# Patient Record
Sex: MALE | Race: WHITE | NOT HISPANIC OR LATINO | Employment: OTHER | URBAN - METROPOLITAN AREA
[De-identification: names, ages, dates, MRNs, and addresses within clinical notes are randomized per-mention and may not be internally consistent; named-entity substitution may affect disease eponyms.]

---

## 2017-04-04 PROBLEM — E11.9 TYPE 2 DIABETES MELLITUS WITHOUT COMPLICATION, WITHOUT LONG-TERM CURRENT USE OF INSULIN: Status: ACTIVE | Noted: 2017-04-04

## 2017-07-20 DIAGNOSIS — E61.1 IRON DEFICIENCY: Primary | ICD-10-CM

## 2017-07-20 RX ORDER — IRON,CARBONYL/ASCORBIC ACID 100-250 MG
TABLET ORAL
Qty: 30 TABLET | Refills: 5 | Status: SHIPPED | OUTPATIENT
Start: 2017-07-20 | End: 2018-01-16 | Stop reason: SDUPTHER

## 2017-07-30 DIAGNOSIS — E53.8 B12 DEFICIENCY: Primary | ICD-10-CM

## 2017-08-06 RX ORDER — CYANOCOBALAMIN 1000 UG/ML
INJECTION, SOLUTION INTRAMUSCULAR; SUBCUTANEOUS
Qty: 6 ML | Status: SHIPPED | OUTPATIENT
Start: 2017-08-06 | End: 2018-09-30 | Stop reason: SDUPTHER

## 2017-10-13 PROBLEM — E11.42 DIABETIC POLYNEUROPATHY ASSOCIATED WITH TYPE 2 DIABETES MELLITUS: Status: ACTIVE | Noted: 2017-10-13

## 2018-01-16 DIAGNOSIS — E61.1 IRON DEFICIENCY: ICD-10-CM

## 2018-01-16 RX ORDER — IRON,CARBONYL/ASCORBIC ACID 100-250 MG
TABLET ORAL
Qty: 30 TABLET | Refills: 4 | Status: SHIPPED | OUTPATIENT
Start: 2018-01-16 | End: 2018-06-20 | Stop reason: SDUPTHER

## 2018-04-18 ENCOUNTER — OFFICE VISIT (OUTPATIENT)
Dept: HEMATOLOGY/ONCOLOGY | Facility: CLINIC | Age: 73
End: 2018-04-18
Payer: MEDICARE

## 2018-04-18 VITALS
WEIGHT: 205.13 LBS | RESPIRATION RATE: 18 BRPM | BODY MASS INDEX: 31.09 KG/M2 | HEART RATE: 77 BPM | DIASTOLIC BLOOD PRESSURE: 77 MMHG | HEIGHT: 68 IN | SYSTOLIC BLOOD PRESSURE: 143 MMHG | TEMPERATURE: 99 F

## 2018-04-18 DIAGNOSIS — D63.8 CHRONIC DISEASE ANEMIA: ICD-10-CM

## 2018-04-18 DIAGNOSIS — D68.9 BLOOD CLOTTING DISORDER: Primary | ICD-10-CM

## 2018-04-18 DIAGNOSIS — D51.0 PERNICIOUS ANEMIA: ICD-10-CM

## 2018-04-18 DIAGNOSIS — D64.9 ANEMIA, UNSPECIFIED TYPE: ICD-10-CM

## 2018-04-18 DIAGNOSIS — D50.8 IRON DEFICIENCY ANEMIA SECONDARY TO INADEQUATE DIETARY IRON INTAKE: ICD-10-CM

## 2018-04-18 DIAGNOSIS — Z15.89 HETEROZYGOUS MTHFR MUTATION A1298C: ICD-10-CM

## 2018-04-18 PROCEDURE — 99213 OFFICE O/P EST LOW 20 MIN: CPT | Mod: ,,, | Performed by: INTERNAL MEDICINE

## 2018-04-18 RX ORDER — LEVOCETIRIZINE DIHYDROCHLORIDE 5 MG/1
5 TABLET, FILM COATED ORAL
COMMUNITY

## 2018-04-18 NOTE — LETTER
April 18, 2018      Lemuel Andino MD  [address]  [address]  [facility] [address]           [facility] Hematology Oncology  [address] [facility]  [address]  [facility] [address]  Phone: [phone]  Fax: [phone]          Patient: Kleber Osman   MR Number: [id number]   YOB: 1945   Date of Visit: 4/18/2018       Dear Dr. Lemuel Andino:    Thank you for referring Kleber Osman to me for evaluation. Attached you will find relevant portions of my assessment and plan of care.    If you have questions, please do not hesitate to call me. I look forward to following Kleber Osman along with you.    Sincerely,    Bryce Stewart MD    Enclosure  CC:  No Recipients    If you would like to receive this communication electronically, please contact [email or web address] or [phone] to request more information on [employer] Link access.    For providers and/or their staff who would like to refer a patient to [facility], please contact us through our one-stop-shop provider referral line, [facility], at [phone].    If you feel you have received this communication in error or would no longer like to receive these types of communications, please e-mail [email or web address]

## 2018-04-18 NOTE — PROGRESS NOTES
[facility] Hematology/Oncology  PROGRESS NOTE      Subjective:       Patient ID:   NAME: Kleber Osman : 1945     73 y.o. male    Referring Doc: Arabella  Other Physicians: Vikki    Chief Complaint:  anemia f/u    History of Present Illness:     Patient returns today for a regularly scheduled follow-up visit.  The patient last showed for a hematology clinic appointment in Dec 2016. Last available labs are from Dec 2017. He had some bronchitis issues and required steroids. He is still on coumadin. He denies any current CP, SOB, HA's or N/V. He is by himself.             ROS:   GEN: normal without any fever, night sweats or weight loss  HEENT: normal with no HA's, sore throat, stiff neck, changes in vision  CV: normal with no CP, SOB, PND, JUDGE or orthopnea  PULM: normal with no SOB, cough, hemoptysis, sputum or pleuritic pain  GI: normal with no abdominal pain, nausea, vomiting, constipation, diarrhea, melanotic stools, BRBPR, or hematemesis  : normal with no hematuria, dysuria  BREAST: normal with no mass, discharge, pain  SKIN: normal with no rash, erythema, bruising, or swelling    Allergies:  Review of patient's allergies indicates:   Allergen Reactions    Prednisone      Pt states Dr. Bell told him not to take prednisone due to coumadin use     Lorcet (hydrocodone) [hydrocodone-acetaminophen] Other (See Comments)     Hallucinations       Medications:    Current Outpatient Prescriptions:     levocetirizine (XYZAL) 5 MG tablet, Take 5 mg by mouth every evening., Disp: , Rfl:     albuterol (PROAIR HFA) 90 mcg/actuation inhaler, Inhale 2 puffs into the lungs every 6 (six) hours as needed for Wheezing., Disp: , Rfl:     amlodipine (NORVASC) 10 MG tablet, Take 1 tablet by mouth once daily., Disp: , Rfl:     aspirin 325 MG tablet, Take 325 mg by mouth once daily., Disp: , Rfl:     benazepril (LOTENSIN) 40 MG tablet, Take 1 tablet by mouth once daily., Disp: , Rfl:     cyanocobalamin 1,000 mcg/mL  "injection, INJECT ONE ML INTRAMUSCULARLY ONCE A MONTH, Disp: 6 mL, Rfl: PRN    cyclobenzaprine (FLEXERIL) 10 MG tablet, TAKE ONE TABLET BY MOUTH THREE TIMES DAILY AS NEEDED FOR SEVERE PAIN, Disp: 30 tablet, Rfl: 0    irbesartan-hydrochlorothiazide (AVALIDE) 150-12.5 mg per tablet, TAKE ONE TABLET BY MOUTH DAILY, Disp: 30 tablet, Rfl: 11    iron-vitamin C 100-250 mg, ICAR-C, 100-250 mg Tab, TAKE ONE TABLET BY MOUTH DAILY, Disp: 30 tablet, Rfl: 4    isosorbide mononitrate (IMDUR) 30 MG 24 hr tablet, Take 1 tablet by mouth once daily., Disp: , Rfl:     lovastatin (MEVACOR) 40 MG tablet, TAKE ONE TABLET BY MOUTH ONE TIME DAILY IN THE EVENING, Disp: 30 tablet, Rfl: 11    metFORMIN (GLUCOPHAGE) 1000 MG tablet, TAKE ONE TABLET BY MOUTH WITH EVENING MEAL., Disp: 30 tablet, Rfl: 0    metoprolol tartrate (LOPRESSOR) 50 MG tablet, TAKE ONE TABLET BY MOUTH TWICE DAILY., Disp: 60 tablet, Rfl: 9    triamcinolone (NASACORT) 55 mcg nasal inhaler, 2 sprays by Nasal route once daily., Disp: , Rfl:     warfarin (COUMADIN) 5 MG tablet, Take 5 mg by mouth Daily. 5mg 4 days a week and 7.5mg 3 days a week, Disp: , Rfl:     PMHx/PSHx Updates:  See patient's last visit with me on 12/12/2016.  See H&P on 12/4/2012        Pathology:  none          Objective:     Vitals:  Blood pressure (!) 143/77, pulse 77, temperature 98.5 °F (36.9 °C), resp. rate 18, height 5' 8" (1.727 m), weight 93 kg (205 lb 1.6 oz).    Physical Examination:   GEN: no apparent distress, comfortable; AAOx3  HEAD: atraumatic and normocephalic  EYES: no pallor, no icterus, PERRLA  ENT: OMM, no pharyngeal erythema, external ears WNL; no nasal discharge; no thrush  NECK: no masses, thyroid normal, trachea midline, no LAD/LN's, supple  CV: RRR with no murmur; normal pulse; normal S1 and S2; no pedal edema  CHEST: Normal respiratory effort; CTAB; normal breath sounds; no wheeze or crackles  ABDOM: nontender and nondistended; soft; normal bowel sounds; no " rebound/guarding  MUSC/Skeletal: ROM normal; no crepitus; joints normal; no deformities or arthropathy  EXTREM: no clubbing, cyanosis, inflammation or swelling  SKIN: no rashes, lesions, ulcers, petechiae or subcutaneous nodules  : no castillo  NEURO: grossly intact; motor/sensory WNL; AAOx3; no tremors  PSYCH: normal mood, affect and behavior  LYMPH: normal cervical, supraclavicular, axillary and groin LN's            Labs:     12/20/2017  Lab Results   Component Value Date    WBC 8.84 12/20/2017    HGB 13.7 (L) 12/20/2017    HCT 41.4 12/20/2017    MCV 91 12/20/2017     12/20/2017     BMP  Lab Results   Component Value Date     12/20/2017    K 3.5 12/20/2017    CL 99 12/20/2017    CO2 31 12/20/2017    BUN 14 12/20/2017    CREATININE 0.75 12/20/2017    CALCIUM 9.7 12/20/2017    ANIONGAP 14 12/20/2017    ESTGFRAFRICA >60 12/20/2017    EGFRNONAA >60 12/20/2017     Lab Results   Component Value Date    ALT 28 12/20/2017    AST 26 12/20/2017    ALKPHOS 59 12/20/2017    BILITOT 0.5 12/20/2017           Radiology/Diagnostic Studies:    No results found.    I have reviewed all available lab results and radiology reports.    Assessment/Plan:   (1) 73 y.o. male with diagnosis of mild chronic anemia in past  - NCNC parameters with B12 and iron deficiency components  - previously on iron and B12 supplements  - hgb adequate in dec 2017 with hgb at 13.7      (2) HTN    (3) CAD S/P MI in past  - underlying MTHFR-A heterozygous   - followed by Dr Bell with cardiology  - on coumadin per direction of cardiology    (4) Noncompliance with labs and follow-up which hinders my ability to provide him with care        PLAN:  1. Continue per direction of Dr Bell and Sina  2. F/u with PCP, cardoiology  3. Monitor labs per directives of PCP and cardiology  4.  RTC in 12 months  Fax note to Lemuel Andino MD, Arabella        Discussion:     I have explained all of the above in detail and the patient understands all of the  current recommendation(s). I have answered all of their questions to the best of my ability and to their complete satisfaction.   The patient is to continue with the current management plan.            Electronically signed by Bryce Stewart MD

## 2018-06-20 DIAGNOSIS — E61.1 IRON DEFICIENCY: ICD-10-CM

## 2018-06-21 RX ORDER — IRON,CARBONYL/ASCORBIC ACID 100-250 MG
TABLET ORAL
Qty: 30 TABLET | Refills: 3 | Status: SHIPPED | OUTPATIENT
Start: 2018-06-21 | End: 2018-07-26 | Stop reason: SDUPTHER

## 2018-07-26 DIAGNOSIS — E61.1 IRON DEFICIENCY: ICD-10-CM

## 2018-07-27 RX ORDER — IRON,CARBONYL/ASCORBIC ACID 100-250 MG
TABLET ORAL
Qty: 30 TABLET | Refills: 0 | Status: SHIPPED | OUTPATIENT
Start: 2018-07-27 | End: 2018-08-26 | Stop reason: SDUPTHER

## 2018-08-26 DIAGNOSIS — E61.1 IRON DEFICIENCY: ICD-10-CM

## 2018-08-27 RX ORDER — IRON,CARBONYL/ASCORBIC ACID 100-250 MG
TABLET ORAL
Qty: 30 TABLET | Refills: 0 | Status: SHIPPED | OUTPATIENT
Start: 2018-08-27 | End: 2018-08-29 | Stop reason: SDUPTHER

## 2018-08-29 DIAGNOSIS — E61.1 IRON DEFICIENCY: ICD-10-CM

## 2018-08-29 RX ORDER — IRON,CARBONYL/ASCORBIC ACID 100-250 MG
TABLET ORAL
Qty: 30 TABLET | Refills: 0 | Status: SHIPPED | OUTPATIENT
Start: 2018-08-29 | End: 2018-10-24 | Stop reason: SDUPTHER

## 2018-08-29 NOTE — TELEPHONE ENCOUNTER
----- Message from Lorelei Cardenas sent at 8/29/2018 11:37 AM CDT -----  Pt called and requested a refill on the following medication(s):   PLEASE REFILL X 12    cyanocobalamin 1,000 mcg/mL injection 6 mL  ONCE A MONTH    iron-vitamin C 100-250 mg, ICAR-C, 100-250 mg Tab 30 tablet     Pharmacy: [facility] [place] [facility]# [phone]    Thanks,  Lorelei

## 2018-09-30 DIAGNOSIS — E53.8 B12 DEFICIENCY: ICD-10-CM

## 2018-10-01 RX ORDER — CYANOCOBALAMIN 1000 UG/ML
INJECTION, SOLUTION INTRAMUSCULAR; SUBCUTANEOUS
Qty: 3 ML | Refills: 0 | Status: SHIPPED | OUTPATIENT
Start: 2018-10-01 | End: 2019-01-10 | Stop reason: SDUPTHER

## 2018-10-24 DIAGNOSIS — E61.1 IRON DEFICIENCY: ICD-10-CM

## 2018-10-24 RX ORDER — IRON,CARBONYL/ASCORBIC ACID 100-250 MG
TABLET ORAL
Qty: 30 TABLET | Refills: 0 | Status: SHIPPED | OUTPATIENT
Start: 2018-10-24 | End: 2018-11-20 | Stop reason: SDUPTHER

## 2018-11-20 DIAGNOSIS — E61.1 IRON DEFICIENCY: ICD-10-CM

## 2018-11-21 RX ORDER — IRON,CARBONYL/ASCORBIC ACID 100-250 MG
TABLET ORAL
Qty: 30 TABLET | Refills: 0 | Status: SHIPPED | OUTPATIENT
Start: 2018-11-21 | End: 2018-12-19 | Stop reason: SDUPTHER

## 2018-12-19 DIAGNOSIS — E61.1 IRON DEFICIENCY: ICD-10-CM

## 2018-12-20 RX ORDER — IRON,CARBONYL/ASCORBIC ACID 100-250 MG
TABLET ORAL
Qty: 30 TABLET | Refills: 0 | Status: SHIPPED | OUTPATIENT
Start: 2018-12-20 | End: 2019-01-19 | Stop reason: SDUPTHER

## 2019-01-10 DIAGNOSIS — E53.8 B12 DEFICIENCY: ICD-10-CM

## 2019-01-10 RX ORDER — CYANOCOBALAMIN 1000 UG/ML
INJECTION, SOLUTION INTRAMUSCULAR; SUBCUTANEOUS
Qty: 3 ML | Refills: 0 | Status: SHIPPED | OUTPATIENT
Start: 2019-01-10

## 2019-01-19 DIAGNOSIS — E61.1 IRON DEFICIENCY: ICD-10-CM

## 2019-01-21 RX ORDER — IRON,CARBONYL/ASCORBIC ACID 100-250 MG
TABLET ORAL
Qty: 30 TABLET | Refills: 0 | Status: SHIPPED | OUTPATIENT
Start: 2019-01-21 | End: 2019-02-17 | Stop reason: SDUPTHER

## 2019-02-14 PROBLEM — I25.10 CORONARY ATHEROSCLEROSIS OF NATIVE CORONARY ARTERY: Status: ACTIVE | Noted: 2019-02-14

## 2019-02-17 DIAGNOSIS — E61.1 IRON DEFICIENCY: ICD-10-CM

## 2019-02-18 RX ORDER — IRON,CARBONYL/ASCORBIC ACID 100-250 MG
TABLET ORAL
Qty: 30 TABLET | Refills: 0 | Status: SHIPPED | OUTPATIENT
Start: 2019-02-18 | End: 2019-03-23 | Stop reason: SDUPTHER

## 2019-03-18 ENCOUNTER — INITIAL CONSULT (OUTPATIENT)
Dept: NEUROSURGERY | Facility: CLINIC | Age: 74
End: 2019-03-18
Payer: MEDICARE

## 2019-03-18 ENCOUNTER — TELEPHONE (OUTPATIENT)
Dept: NEUROSURGERY | Facility: CLINIC | Age: 74
End: 2019-03-18

## 2019-03-18 VITALS
SYSTOLIC BLOOD PRESSURE: 137 MMHG | HEART RATE: 74 BPM | WEIGHT: 204.5 LBS | HEIGHT: 68 IN | DIASTOLIC BLOOD PRESSURE: 66 MMHG | BODY MASS INDEX: 30.99 KG/M2

## 2019-03-18 DIAGNOSIS — M54.17 LUMBOSACRAL RADICULOPATHY AT S1: ICD-10-CM

## 2019-03-18 PROCEDURE — 99204 PR OFFICE/OUTPT VISIT, NEW, LEVL IV, 45-59 MIN: ICD-10-PCS | Mod: S$PBB,,, | Performed by: NEUROLOGICAL SURGERY

## 2019-03-18 PROCEDURE — 99999 PR PBB SHADOW E&M-NEW PATIENT-LVL III: CPT | Mod: PBBFAC,,, | Performed by: NEUROLOGICAL SURGERY

## 2019-03-18 PROCEDURE — 99203 OFFICE O/P NEW LOW 30 MIN: CPT | Mod: PBBFAC,PN | Performed by: NEUROLOGICAL SURGERY

## 2019-03-18 PROCEDURE — 99204 OFFICE O/P NEW MOD 45 MIN: CPT | Mod: S$PBB,,, | Performed by: NEUROLOGICAL SURGERY

## 2019-03-18 PROCEDURE — 99999 PR PBB SHADOW E&M-NEW PATIENT-LVL III: ICD-10-PCS | Mod: PBBFAC,,, | Performed by: NEUROLOGICAL SURGERY

## 2019-03-18 NOTE — LETTER
March 18, 2019      Lemuel Andino MD  [address]  [facility] [address]           [facility] Neuro[facility]  [address] [facility] [address] [place] [address]  Phone: [phone]  Fax: [phone]          Patient: Kleber Osman   MR Number: [id number]   YOB: 1945   Date of Visit: 3/18/2019       Dear Dr. Lemuel Andino:    Thank you for referring Kleber Osman to me for evaluation. Attached you will find relevant portions of my assessment and plan of care.    If you have questions, please do not hesitate to call me. I look forward to following Kleber Osman along with you.    Sincerely,    William Charlton MD    Enclosure  CC:  No Recipients    If you would like to receive this communication electronically, please contact [email or web address] or [phone] to request more information on [facility] Link access.    For providers and/or their staff who would like to refer a patient to [facility], please contact us through our one-stop-shop provider referral line, [facility], at [phone].    If you feel you have received this communication in error or would no longer like to receive these types of communications, please e-mail [email or web address]
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
## 2019-03-18 NOTE — TELEPHONE ENCOUNTER
Spoke to patient and he is scheduled for an appointment next week and he will call if that date does not work.

## 2019-03-23 DIAGNOSIS — E61.1 IRON DEFICIENCY: ICD-10-CM

## 2019-03-24 RX ORDER — IRON,CARBONYL/ASCORBIC ACID 100-250 MG
TABLET ORAL
Qty: 30 TABLET | Refills: 0 | Status: SHIPPED | OUTPATIENT
Start: 2019-03-24 | End: 2019-06-23 | Stop reason: SDUPTHER

## 2019-03-25 ENCOUNTER — OFFICE VISIT (OUTPATIENT)
Dept: PAIN MEDICINE | Facility: CLINIC | Age: 74
End: 2019-03-25
Payer: MEDICARE

## 2019-03-25 ENCOUNTER — TELEPHONE (OUTPATIENT)
Dept: HEMATOLOGY/ONCOLOGY | Facility: CLINIC | Age: 74
End: 2019-03-25

## 2019-03-25 VITALS
OXYGEN SATURATION: 95 % | BODY MASS INDEX: 30.69 KG/M2 | WEIGHT: 202.5 LBS | SYSTOLIC BLOOD PRESSURE: 149 MMHG | TEMPERATURE: 97 F | HEART RATE: 83 BPM | RESPIRATION RATE: 20 BRPM | DIASTOLIC BLOOD PRESSURE: 63 MMHG | HEIGHT: 68 IN

## 2019-03-25 DIAGNOSIS — M54.41 ACUTE BILATERAL LOW BACK PAIN WITH RIGHT-SIDED SCIATICA: ICD-10-CM

## 2019-03-25 DIAGNOSIS — M54.16 LUMBAR RADICULOPATHY: Primary | ICD-10-CM

## 2019-03-25 PROCEDURE — 99999 PR PBB SHADOW E&M-EST. PATIENT-LVL III: CPT | Mod: PBBFAC,,, | Performed by: ANESTHESIOLOGY

## 2019-03-25 PROCEDURE — 99204 OFFICE O/P NEW MOD 45 MIN: CPT | Mod: S$PBB,,, | Performed by: ANESTHESIOLOGY

## 2019-03-25 PROCEDURE — 99213 OFFICE O/P EST LOW 20 MIN: CPT | Mod: PBBFAC,PN | Performed by: ANESTHESIOLOGY

## 2019-03-25 PROCEDURE — 99999 PR PBB SHADOW E&M-EST. PATIENT-LVL III: ICD-10-PCS | Mod: PBBFAC,,, | Performed by: ANESTHESIOLOGY

## 2019-03-25 PROCEDURE — 99204 PR OFFICE/OUTPT VISIT, NEW, LEVL IV, 45-59 MIN: ICD-10-PCS | Mod: S$PBB,,, | Performed by: ANESTHESIOLOGY

## 2019-03-25 NOTE — LETTER
March 25, 2019      William Charlton MD  [address] [facility] [address] [place] [address]           [facility]  [address] [facility] [address] [place] [address]  Phone: [phone]  Fax: [phone]          Patient: Kleber Osman   MR Number: [id number]   YOB: 1945   Date of Visit: 3/25/2019       Dear Dr. William Charlton:    Thank you for referring Kleber Osman to me for evaluation. Attached you will find relevant portions of my assessment and plan of care.    If you have questions, please do not hesitate to call me. I look forward to following Kleber Osman along with you.    Sincerely,    Augustin Blackburn MD    Enclosure  CC:  No Recipients    If you would like to receive this communication electronically, please contact [email or web address] or [phone] to request more information on [employer] Link access.    For providers and/or their staff who would like to refer a patient to [facility], please contact us through our one-stop-shop provider referral line, [facility], at [phone].    If you feel you have received this communication in error or would no longer like to receive these types of communications, please e-mail [email or web address]

## 2019-03-25 NOTE — TELEPHONE ENCOUNTER
Called refill auth in for ICR-C and B_12 to pharmacy       ----- Message from oLrelei Cardenas sent at 3/25/2019  9:47 AM CDT -----  Pt needs refill on: cyanocobalamin 1,000 mcg/mL injection 3 mL   iron-vitamin C 100-250 mg, ICAR-C, 100-250 mg Tab 30 tablet     Pharmacy: [facility]# [phone]    Thanks,  Lorelei

## 2019-03-25 NOTE — PROGRESS NOTES
[facility] New Patient Evaluation    Referred by: Dr. Charlton  Reason for referral: back pain    CC:   Chief Complaint   Patient presents with    Establish Care    Low-back Pain      Last 3 PDI Scores 3/25/2019   Pain Disability Index (PDI) 6       HPI:   Kleber Osman is a 74 y.o. male who complains of back pain    He has evidence of remote left L4-L5 hemilaminectomy. There is ongoing significant stenosis at that level, with a contribution from a broad based disc bulge with a suspected annular tear. At L5-S1, there is a smaller, but still significant broad-based disc herniation, excentric to the right, where it contacts the right S1 nerve.      One month of pain radiating from right sacrum to buttock, posterior thigh and into his ankle with some numbness in the right ankle and lateral foot.      On exam he has no weakness but does have numbness in the right S1 dermatome and an absent right Achilles reflex.     I suspect he has acute right S1 radiculopathy from the the L5-S1 disc herniation. The degree of stenosis at that level is not severe on MRI and I suspect he will do well with conservative measures. Will refer to Pain for ZOHAIB and order PT. He may follow up as needed if his symptoms do not improve.    Onset: a month  Inciting Event: February 26th, hit a hole while riding his lawnmower  Progression: since onset, pain is gradually improving  Current Pain Score: 1/10  Typical Range: 1-6/10  Timing: intermittent  Quality: aching, burning, sharp  Radiation: yes, down outside of right leg  Associated numbness or weakness: yes numbness, no weakness  Exacerbated by: sitting, laying bending, getting out of bed  Allievated by: rest, tylenol  Is Pain Level Acceptable?: No    Previous Therapies:  PT/OT:   HEP:   Interventions:   Surgery:  Medications:   - NSAIDS:   - MSK Relaxants:   - TCAs:   - SNRIs:   - Topicals:   - Anticonvulsants:  - Opioids:     Current Pain Medications:  1. Robaxin,  tylenol    History:    Current Outpatient Medications:     albuterol (PROAIR HFA) 90 mcg/actuation inhaler, Inhale 2 puffs into the lungs every 6 (six) hours as needed for Wheezing., Disp: , Rfl:     amlodipine (NORVASC) 10 MG tablet, Take 1 tablet by mouth once daily., Disp: , Rfl:     aspirin 325 MG tablet, Take 325 mg by mouth once daily., Disp: , Rfl:     benazepril (LOTENSIN) 40 MG tablet, Take 1 tablet by mouth once daily., Disp: , Rfl:     cyanocobalamin 1,000 mcg/mL injection, INJECT 1 ML INTRAMUSCULARLY EVERY MONTH. (Patient taking differently: INJECT 1 ML INTRAMUSCULARLY EVERY MONTH. (1st)), Disp: 3 mL, Rfl: 0    cyclobenzaprine (FLEXERIL) 10 MG tablet, TAKE ONE TABLET BY MOUTH THREE TIMES DAILY AS NEEDED FOR SEVERE PAIN, Disp: 30 tablet, Rfl: 0    irbesartan-hydrochlorothiazide (AVALIDE) 150-12.5 mg per tablet, TAKE ONE TABLET BY MOUTH DAILY, Disp: 30 tablet, Rfl: 10    iron-vitamin C 100-250 mg, ICAR-C, 100-250 mg Tab, TAKE ONE TABLET BY MOUTH DAILY, Disp: 30 tablet, Rfl: 0    isosorbide mononitrate (IMDUR) 30 MG 24 hr tablet, Take 1 tablet by mouth once daily., Disp: , Rfl:     levocetirizine (XYZAL) 5 MG tablet, Take 5 mg by mouth 2 (two) times daily. , Disp: , Rfl:     lovastatin (MEVACOR) 40 MG tablet, TAKE ONE TABLET BY MOUTH ONE TIME DAILY IN THE EVENING, Disp: 30 tablet, Rfl: 10    metFORMIN (GLUCOPHAGE) 1000 MG tablet, TAKE ONE TABLET BY MOUTH WITH EVENING MEAL., Disp: 30 tablet, Rfl: 11    methocarbamol (ROBAXIN) 750 MG Tab, Take 1 tablet (750 mg total) by mouth 3 (three) times daily as needed., Disp: 30 tablet, Rfl: 1    metoprolol tartrate (LOPRESSOR) 50 MG tablet, TAKE ONE TABLET BY MOUTH TWICE DAILY., Disp: 60 tablet, Rfl: 9    triamcinolone (NASACORT) 55 mcg nasal inhaler, 2 sprays by Nasal route daily as needed. , Disp: , Rfl:     warfarin (COUMADIN) 5 MG tablet, Take 5 mg by mouth every evening. 5mg 4 days a week and 7.5mg 3 days a week, Disp: , Rfl:     Past Medical  History:   Diagnosis Date    Anemia, unspecified 4/18/2018    Anticoagulant long-term use     Arthritis     Asthma     Atypical nevus     CAD (coronary artery disease)     Chronic disease anemia 4/18/2018    Clotting disorder     hypergammaglobulinemia    Gout     Heart attack     x4    Insomnia     Iron deficiency anemia secondary to inadequate dietary iron intake 4/18/2018    Ischemic heart disease, chronic     Other and unspecified hyperlipidemia     Pernicious anemia 4/18/2018    Type II or unspecified type diabetes mellitus without mention of complication, not stated as uncontrolled     Unspecified diseases of blood and blood-forming organs     Unspecified essential hypertension        Past Surgical History:   Procedure Laterality Date    ANGIOGRAM, CORONARY ARTERY N/A 2/14/2019    Per[facility] by [facility] at [facility] CATH    APPENDECTOMY      BACK SURGERY      CARDIAC CATHETERIZATION      angioplasty and stents    cardiac stents      CATARACT EXTRACTION W/ INTRAOCULAR LENS  IMPLANT, BILATERAL      CORONARY ARTERY BYPASS GRAFT  2000    INSERTION, STENT, CORONARY ARTERY N/A 2/14/2019    [facility]    LEG SURGERY Left     steel lucinda placement    Percutaneous coronary intervention N/A 2/14/2019    Per[facility] by [facility] at [facility] CATH    PROCTOSCOPY      sigmoid colectomy      skin cancers removed       Ultrasound-coronary N/A 2/14/2019    Performed by [facility] at [facility] CATH       Family History   Adopted: Yes   Family history unknown: Yes       Social History     Socioeconomic History    Marital status:      Spouse name: None    Number of children: None    Years of education: None    Highest education level: None   Occupational History    None   Social Needs    Financial resource strain: None    Food insecurity:     Worry: None     Inability: None    Transportation needs:     Medical: None     Non-medical: None  "  Tobacco Use    Smoking status: Former Smoker    Smokeless tobacco: Former User    Tobacco comment: quit over 20 years ago   Substance and Sexual Activity    Alcohol use: No    Drug use: No    Sexual activity: None   Lifestyle    Physical activity:     Days per week: None     Minutes per session: None    Stress: None   Relationships    Social connections:     Talks on phone: None     Gets together: None     Attends Christianity service: None     Active member of club or organization: None     Attends meetings of clubs or organizations: None     Relationship status: None    Intimate partner violence:     Fear of current or ex partner: None     Emotionally abused: None     Physically abused: None     Forced sexual activity: None   Other Topics Concern    None   Social History Narrative    None       Review of patient's allergies indicates:   Allergen Reactions    Prednisone      Pt states Dr. Bell told him not to take prednisone due to coumadin use     Lorcet (hydrocodone) [hydrocodone-acetaminophen] Other (See Comments)     Hallucinations       Review of Systems:  General ROS: negative for - fever  Psychological ROS: negative for - hostility  Hematological and Lymphatic ROS: negative for - bleeding problems  Endocrine ROS: negative for - unexpected weight changes  Respiratory ROS: no cough, shortness of breath, or wheezing  Cardiovascular ROS: no chest pain or dyspnea on exertion  Gastrointestinal ROS: no abdominal pain, change in bowel habits, or black or bloody stools  Musculoskeletal ROS: negative for - muscular weakness  Neurological ROS: positive for - numbness/tingling  negative for - bowel and bladder control changes or impaired coordination/balance  Dermatological ROS: negative for rash    Physical Exam:  Vitals:    03/25/19 1446   BP: (!) 149/63   Pulse: 83   Resp: 20   Temp: 97.2 °F (36.2 °C)   TempSrc: Oral   SpO2: 95%   Weight: 91.8 kg (202 lb 7.9 oz)   Height: 5' 8" (1.727 m)   PainSc:   2 "   PainLoc: Back     Body mass index is 30.79 kg/m².     Gen: NAD  Gait: gait intact  Psych:  Mood appropriate for given condition  HEENT: eyes anicteric   GI: Abd soft  CV: RRR  Lungs: breathing unlabored   ROM: limited AROM of the L spine in all planes, full ROM at ankles, knees and hips  Sensation: intact to light touch in all dermatomes tested from L2-S1 bilaterally except decreased over right L5  Reflexes: 1+ b/l patella and 0/0  Palpation: Diffusely tender over lumbar paraspinals  -TTP over the b/l greater trochanters and bilateral SI joint  Tone: normal in the b/l knees and hips   Skin: intact  Extremities: No edema in b/l ankles or hands  Provacative tests: + SLR on the right, - Martinez       Right Left   L2/3 Iliacus Hip flexion  5  5   L3/4 Qudratus Femoris Knee Extension  5  5   L4/5 Tib Anterior Ankle Dorsiflexion   5  5   L5/S1 Extensor Hallicus Longus Great toe extension  5  5   L4/5 Tib Anterior/Posterior Inversion  5  5   L5/S1 Extensor Digitorum Longus, Peronues Eversion  5  5   S1/S2 Gastroc/Soleus Plantar Flexion  5  5       Imaging:  MRI lumbar spine 3/14/19  FINDINGS:  Vertebral bodies normal in height and alignment.  There is endplate edema anteriorly at L1-2 and L2-3 compatible with Modic type 1 changes.  No acute fracture.  Disc desiccation and disc space narrowing is present throughout the lumbar spine most prominent at L4-5.    Conus medullaris terminates at the T12-L1 level.    L1-2: Small biconvex disc bulge and mild facet hypertrophy results in mild left lateral recess stenosis.  Neural foramen and spinal canal are patent.  L2-3: Broad-based disc bulge with facet hypertrophy results in mild to moderate bilateral neural foraminal and lateral recess stenosis.  Spinal canal is patent.  L3-4: Small broad-based disc bulge and mild facet hypertrophy results in mild bilateral lateral recess stenosis.  Neural foramen and spinal canal are patent.  L4-5: Right eccentric broad-based disc bulge along  with mild facet hypertrophy results in mild to moderate right greater than left neural foraminal and lateral recess stenosis with mild spinal canal stenosis.  L5-S1: Broad-based disc bulge and mild facet hypertrophy results in mild bilateral neural foraminal and lateral recess stenosis.  The disc bulge contacts the right traversing S1 nerve root.    Paravertebral soft tissues are normal.    Labs:  BMP  Lab Results   Component Value Date     03/14/2019    K 4.3 03/14/2019     03/14/2019    CO2 29 03/14/2019    BUN 14 03/14/2019    CREATININE 0.82 03/14/2019    CALCIUM 9.9 03/14/2019    ANIONGAP 9 03/14/2019    ESTGFRAFRICA >60 03/14/2019    EGFRNONAA >60 03/14/2019     Lab Results   Component Value Date    ALT 26 03/14/2019    AST 28 03/14/2019    ALKPHOS 64 03/14/2019    BILITOT 0.5 03/14/2019       Assessment:  Problem List Items Addressed This Visit        Neuro    Lumbar radiculopathy - Primary       Orthopedic    Acute bilateral low back pain with right-sided sciatica          Treatment Plan:  74 y.o. year old male with PMH gout, CAD s/p stent placement 2/14/19 presents with lower back pain.  MRI with evidence of remote left L4-L5 hemilaminectomy.  His pain started on 2/26/19 when he drove his lawnmower and it hit a hole.  Since that time he feels like his pain has greatly improved.  Today his pain is 1/10, intermittent, sharp, radiating down the side of his right leg.  + numbness, no weakness.  He hasn't started PT yet, but I strongly encouraged him to start.  MRI lumbar spine with right L4/5 and L5/S1 NFS.  On exam he has 5/5 strength bilaterally, 1+ b/l patellar and 0/0 b/l achilles.  He has some decreased sensation over L5 on the right.  Given his vast improvement in pain and full strength, combined with coronary stent placement last month, I discussed I would defer on any intervention at this time.  I would like him to continue flexeril prn, tylenol prn, and start PT.  He will follow up in the  office in 2 months following PT.  Can consider gabapentin trial at that time.  If pain should worsen or he develops any new weakness, he can follow up sooner.    Procedures: none at this time  PT/OT/HEP: start PT, already ordered  Medications: continue  flexeril prn, tylenol prn  Labs: Reviewed and medications are appropriately dosed for current hepatorenal function.  Imaging: No additional recommended at this time.    : Not applicable    Augustin Blackburn M.D.  Interventional Pain Medicine / Anesthesiology

## 2019-05-13 ENCOUNTER — TELEPHONE (OUTPATIENT)
Dept: PAIN MEDICINE | Facility: CLINIC | Age: 74
End: 2019-05-13

## 2019-05-13 NOTE — TELEPHONE ENCOUNTER
----- Message from Tequila Tamayo sent at 5/13/2019 12:43 PM CDT -----  Contact: self   Patient want to inform office he is doing great no need for another appointment, any questions please call back at [phone] (home)

## 2019-06-23 DIAGNOSIS — E61.1 IRON DEFICIENCY: ICD-10-CM

## 2019-06-24 RX ORDER — IRON,CARBONYL/ASCORBIC ACID 100-250 MG
TABLET ORAL
Qty: 30 TABLET | Refills: 1 | Status: SHIPPED | OUTPATIENT
Start: 2019-06-24 | End: 2019-08-21 | Stop reason: SDUPTHER

## 2019-08-21 DIAGNOSIS — E61.1 IRON DEFICIENCY: ICD-10-CM

## 2019-08-22 RX ORDER — IRON,CARBONYL/ASCORBIC ACID 100-250 MG
TABLET ORAL
Qty: 30 TABLET | Refills: 0 | Status: SHIPPED | OUTPATIENT
Start: 2019-08-22 | End: 2019-09-10 | Stop reason: SDUPTHER

## 2019-09-10 DIAGNOSIS — E61.1 IRON DEFICIENCY: ICD-10-CM

## 2019-09-10 RX ORDER — IRON,CARBONYL/ASCORBIC ACID 100-250 MG
TABLET ORAL
Qty: 30 TABLET | Refills: 0 | Status: SHIPPED | OUTPATIENT
Start: 2019-09-10

## 2020-01-24 PROBLEM — M54.41 ACUTE BILATERAL LOW BACK PAIN WITH RIGHT-SIDED SCIATICA: Status: RESOLVED | Noted: 2019-03-25 | Resolved: 2020-01-24

## 2020-01-24 PROBLEM — G89.29 CHRONIC LOW BACK PAIN: Status: ACTIVE | Noted: 2020-01-24

## 2020-01-24 PROBLEM — M54.50 CHRONIC LOW BACK PAIN: Status: ACTIVE | Noted: 2020-01-24

## 2020-07-27 PROBLEM — G89.29 CHRONIC PAIN OF BOTH SHOULDERS: Status: ACTIVE | Noted: 2020-07-27

## 2020-07-27 PROBLEM — G89.29 CHRONIC PAIN OF BOTH KNEES: Status: ACTIVE | Noted: 2020-07-27

## 2020-07-27 PROBLEM — M25.562 CHRONIC PAIN OF BOTH KNEES: Status: ACTIVE | Noted: 2020-07-27

## 2020-07-27 PROBLEM — M25.511 CHRONIC PAIN OF BOTH SHOULDERS: Status: ACTIVE | Noted: 2020-07-27

## 2020-07-27 PROBLEM — M25.512 CHRONIC PAIN OF BOTH SHOULDERS: Status: ACTIVE | Noted: 2020-07-27

## 2020-07-27 PROBLEM — M25.561 CHRONIC PAIN OF BOTH KNEES: Status: ACTIVE | Noted: 2020-07-27

## 2021-05-20 ENCOUNTER — LAB VISIT (OUTPATIENT)
Dept: FAMILY MEDICINE | Facility: CLINIC | Age: 76
End: 2021-05-20
Payer: MEDICARE

## 2021-05-20 ENCOUNTER — CLINICAL SUPPORT (OUTPATIENT)
Dept: FAMILY MEDICINE | Facility: CLINIC | Age: 76
End: 2021-05-20
Payer: MEDICARE

## 2021-05-20 DIAGNOSIS — E11.9 TYPE 2 DIABETES MELLITUS WITHOUT COMPLICATION, WITHOUT LONG-TERM CURRENT USE OF INSULIN: ICD-10-CM

## 2021-05-20 DIAGNOSIS — Z12.5 SCREENING FOR MALIGNANT NEOPLASM OF PROSTATE: ICD-10-CM

## 2021-05-20 LAB
ALBUMIN SERPL BCP-MCNC: 4.3 G/DL (ref 3.5–5.2)
ALBUMIN/CREAT UR: 13.1 UG/MG (ref 0–30)
ALP SERPL-CCNC: 77 U/L (ref 55–135)
ALT SERPL W/O P-5'-P-CCNC: 18 U/L (ref 10–44)
ANION GAP SERPL CALC-SCNC: 11 MMOL/L (ref 8–16)
AST SERPL-CCNC: 19 U/L (ref 10–40)
BASOPHILS # BLD AUTO: 0.05 K/UL (ref 0–0.2)
BASOPHILS NFR BLD: 0.5 % (ref 0–1.9)
BILIRUB SERPL-MCNC: 0.7 MG/DL (ref 0.1–1)
BUN SERPL-MCNC: 10 MG/DL (ref 8–23)
CALCIUM SERPL-MCNC: 10.4 MG/DL (ref 8.7–10.5)
CHLORIDE SERPL-SCNC: 102 MMOL/L (ref 95–110)
CHOLEST SERPL-MCNC: 162 MG/DL (ref 120–199)
CHOLEST/HDLC SERPL: 3.7 {RATIO} (ref 2–5)
CO2 SERPL-SCNC: 30 MMOL/L (ref 23–29)
COMPLEXED PSA SERPL-MCNC: 0.65 NG/ML (ref 0–4)
CREAT SERPL-MCNC: 0.9 MG/DL (ref 0.5–1.4)
CREAT UR-MCNC: 191 MG/DL (ref 23–375)
DIFFERENTIAL METHOD: ABNORMAL
EOSINOPHIL # BLD AUTO: 0.2 K/UL (ref 0–0.5)
EOSINOPHIL NFR BLD: 1.8 % (ref 0–8)
ERYTHROCYTE [DISTWIDTH] IN BLOOD BY AUTOMATED COUNT: 13.5 % (ref 11.5–14.5)
EST. GFR  (AFRICAN AMERICAN): >60 ML/MIN/1.73 M^2
EST. GFR  (NON AFRICAN AMERICAN): >60 ML/MIN/1.73 M^2
ESTIMATED AVG GLUCOSE: 126 MG/DL (ref 68–131)
GLUCOSE SERPL-MCNC: 124 MG/DL (ref 70–110)
HBA1C MFR BLD: 6 % (ref 4–5.6)
HCT VFR BLD AUTO: 43.4 % (ref 40–54)
HDLC SERPL-MCNC: 44 MG/DL (ref 40–75)
HDLC SERPL: 27.2 % (ref 20–50)
HGB BLD-MCNC: 14.2 G/DL (ref 14–18)
IMM GRANULOCYTES # BLD AUTO: 0.03 K/UL (ref 0–0.04)
IMM GRANULOCYTES NFR BLD AUTO: 0.3 % (ref 0–0.5)
LDLC SERPL CALC-MCNC: 73 MG/DL (ref 63–159)
LYMPHOCYTES # BLD AUTO: 1.8 K/UL (ref 1–4.8)
LYMPHOCYTES NFR BLD: 17.5 % (ref 18–48)
MCH RBC QN AUTO: 29.4 PG (ref 27–31)
MCHC RBC AUTO-ENTMCNC: 32.7 G/DL (ref 32–36)
MCV RBC AUTO: 90 FL (ref 82–98)
MICROALBUMIN UR DL<=1MG/L-MCNC: 25 UG/ML
MONOCYTES # BLD AUTO: 1 K/UL (ref 0.3–1)
MONOCYTES NFR BLD: 9.9 % (ref 4–15)
NEUTROPHILS # BLD AUTO: 7.1 K/UL (ref 1.8–7.7)
NEUTROPHILS NFR BLD: 70 % (ref 38–73)
NONHDLC SERPL-MCNC: 118 MG/DL
NRBC BLD-RTO: 0 /100 WBC
PLATELET # BLD AUTO: 279 K/UL (ref 150–450)
PMV BLD AUTO: 9.4 FL (ref 9.2–12.9)
POTASSIUM SERPL-SCNC: 4.2 MMOL/L (ref 3.5–5.1)
PROT SERPL-MCNC: 7.6 G/DL (ref 6–8.4)
RBC # BLD AUTO: 4.83 M/UL (ref 4.6–6.2)
SODIUM SERPL-SCNC: 143 MMOL/L (ref 136–145)
TRIGL SERPL-MCNC: 225 MG/DL (ref 30–150)
WBC # BLD AUTO: 10.17 K/UL (ref 3.9–12.7)

## 2021-05-20 PROCEDURE — 82570 ASSAY OF URINE CREATININE: CPT | Performed by: INTERNAL MEDICINE

## 2021-05-20 PROCEDURE — 80053 COMPREHEN METABOLIC PANEL: CPT | Performed by: INTERNAL MEDICINE

## 2021-05-20 PROCEDURE — 36415 COLL VENOUS BLD VENIPUNCTURE: CPT | Performed by: INTERNAL MEDICINE

## 2021-05-20 PROCEDURE — 82043 UR ALBUMIN QUANTITATIVE: CPT | Performed by: INTERNAL MEDICINE

## 2021-05-20 PROCEDURE — 84153 ASSAY OF PSA TOTAL: CPT | Performed by: INTERNAL MEDICINE

## 2021-05-20 PROCEDURE — 85025 COMPLETE CBC W/AUTO DIFF WBC: CPT | Performed by: INTERNAL MEDICINE

## 2021-05-20 PROCEDURE — 83036 HEMOGLOBIN GLYCOSYLATED A1C: CPT | Performed by: INTERNAL MEDICINE

## 2021-05-20 PROCEDURE — 80061 LIPID PANEL: CPT | Performed by: INTERNAL MEDICINE

## 2021-11-17 PROBLEM — H40.9 GLAUCOMA: Status: ACTIVE | Noted: 2021-11-17

## 2021-11-18 ENCOUNTER — LAB VISIT (OUTPATIENT)
Dept: FAMILY MEDICINE | Facility: CLINIC | Age: 76
End: 2021-11-18
Payer: MEDICARE

## 2021-11-18 ENCOUNTER — CLINICAL SUPPORT (OUTPATIENT)
Dept: FAMILY MEDICINE | Facility: CLINIC | Age: 76
End: 2021-11-18
Payer: MEDICARE

## 2021-11-18 DIAGNOSIS — E11.9 TYPE 2 DIABETES MELLITUS WITHOUT COMPLICATION, WITHOUT LONG-TERM CURRENT USE OF INSULIN: ICD-10-CM

## 2021-11-18 DIAGNOSIS — I25.10 CORONARY ARTERY DISEASE INVOLVING NATIVE CORONARY ARTERY OF NATIVE HEART WITHOUT ANGINA PECTORIS: ICD-10-CM

## 2021-11-18 DIAGNOSIS — M10.09 IDIOPATHIC GOUT OF MULTIPLE SITES, UNSPECIFIED CHRONICITY: ICD-10-CM

## 2021-11-18 LAB
ALBUMIN SERPL BCP-MCNC: 4.3 G/DL (ref 3.5–5.2)
ALP SERPL-CCNC: 71 U/L (ref 55–135)
ALT SERPL W/O P-5'-P-CCNC: 23 U/L (ref 10–44)
ANION GAP SERPL CALC-SCNC: 10 MMOL/L (ref 8–16)
AST SERPL-CCNC: 27 U/L (ref 10–40)
BASOPHILS # BLD AUTO: 0.05 K/UL (ref 0–0.2)
BASOPHILS NFR BLD: 0.7 % (ref 0–1.9)
BILIRUB SERPL-MCNC: 0.5 MG/DL (ref 0.1–1)
BUN SERPL-MCNC: 10 MG/DL (ref 8–23)
CALCIUM SERPL-MCNC: 10 MG/DL (ref 8.7–10.5)
CHLORIDE SERPL-SCNC: 99 MMOL/L (ref 95–110)
CHOLEST SERPL-MCNC: 185 MG/DL (ref 120–199)
CHOLEST/HDLC SERPL: 3.7 {RATIO} (ref 2–5)
CO2 SERPL-SCNC: 31 MMOL/L (ref 23–29)
CREAT SERPL-MCNC: 0.8 MG/DL (ref 0.5–1.4)
DIFFERENTIAL METHOD: NORMAL
EOSINOPHIL # BLD AUTO: 0.4 K/UL (ref 0–0.5)
EOSINOPHIL NFR BLD: 5.1 % (ref 0–8)
ERYTHROCYTE [DISTWIDTH] IN BLOOD BY AUTOMATED COUNT: 12.4 % (ref 11.5–14.5)
EST. GFR  (AFRICAN AMERICAN): >60 ML/MIN/1.73 M^2
EST. GFR  (NON AFRICAN AMERICAN): >60 ML/MIN/1.73 M^2
ESTIMATED AVG GLUCOSE: 117 MG/DL (ref 68–131)
GLUCOSE SERPL-MCNC: 122 MG/DL (ref 70–110)
HBA1C MFR BLD: 5.7 % (ref 4–5.6)
HCT VFR BLD AUTO: 45.1 % (ref 40–54)
HDLC SERPL-MCNC: 50 MG/DL (ref 40–75)
HDLC SERPL: 27 % (ref 20–50)
HGB BLD-MCNC: 14.6 G/DL (ref 14–18)
IMM GRANULOCYTES # BLD AUTO: 0.02 K/UL (ref 0–0.04)
IMM GRANULOCYTES NFR BLD AUTO: 0.3 % (ref 0–0.5)
LDLC SERPL CALC-MCNC: 89.4 MG/DL (ref 63–159)
LYMPHOCYTES # BLD AUTO: 1.5 K/UL (ref 1–4.8)
LYMPHOCYTES NFR BLD: 20 % (ref 18–48)
MCH RBC QN AUTO: 29.5 PG (ref 27–31)
MCHC RBC AUTO-ENTMCNC: 32.4 G/DL (ref 32–36)
MCV RBC AUTO: 91 FL (ref 82–98)
MONOCYTES # BLD AUTO: 0.6 K/UL (ref 0.3–1)
MONOCYTES NFR BLD: 8.1 % (ref 4–15)
NEUTROPHILS # BLD AUTO: 5 K/UL (ref 1.8–7.7)
NEUTROPHILS NFR BLD: 65.8 % (ref 38–73)
NONHDLC SERPL-MCNC: 135 MG/DL
NRBC BLD-RTO: 0 /100 WBC
PLATELET # BLD AUTO: 268 K/UL (ref 150–450)
PMV BLD AUTO: 9.3 FL (ref 9.2–12.9)
POTASSIUM SERPL-SCNC: 3.4 MMOL/L (ref 3.5–5.1)
PROT SERPL-MCNC: 7.8 G/DL (ref 6–8.4)
RBC # BLD AUTO: 4.95 M/UL (ref 4.6–6.2)
SODIUM SERPL-SCNC: 140 MMOL/L (ref 136–145)
TRIGL SERPL-MCNC: 228 MG/DL (ref 30–150)
URATE SERPL-MCNC: 5.7 MG/DL (ref 3.4–7)
WBC # BLD AUTO: 7.64 K/UL (ref 3.9–12.7)

## 2021-11-18 PROCEDURE — 84550 ASSAY OF BLOOD/URIC ACID: CPT | Performed by: INTERNAL MEDICINE

## 2021-11-18 PROCEDURE — 80061 LIPID PANEL: CPT | Performed by: INTERNAL MEDICINE

## 2021-11-18 PROCEDURE — 82570 ASSAY OF URINE CREATININE: CPT | Performed by: INTERNAL MEDICINE

## 2021-11-18 PROCEDURE — 36415 COLL VENOUS BLD VENIPUNCTURE: CPT | Performed by: INTERNAL MEDICINE

## 2021-11-18 PROCEDURE — 85025 COMPLETE CBC W/AUTO DIFF WBC: CPT | Performed by: INTERNAL MEDICINE

## 2021-11-18 PROCEDURE — 83036 HEMOGLOBIN GLYCOSYLATED A1C: CPT | Performed by: INTERNAL MEDICINE

## 2021-11-18 PROCEDURE — 80053 COMPREHEN METABOLIC PANEL: CPT | Performed by: INTERNAL MEDICINE

## 2021-11-19 LAB
ALBUMIN/CREAT UR: 5.4 UG/MG (ref 0–30)
CREAT UR-MCNC: 92 MG/DL (ref 23–375)
MICROALBUMIN UR DL<=1MG/L-MCNC: 5 UG/ML

## 2022-04-01 ENCOUNTER — OFFICE VISIT (OUTPATIENT)
Dept: NEUROSURGERY | Facility: CLINIC | Age: 77
End: 2022-04-01
Payer: MEDICARE

## 2022-04-01 VITALS
BODY MASS INDEX: 28.2 KG/M2 | SYSTOLIC BLOOD PRESSURE: 151 MMHG | HEART RATE: 74 BPM | RESPIRATION RATE: 18 BRPM | WEIGHT: 186.06 LBS | HEIGHT: 68 IN | DIASTOLIC BLOOD PRESSURE: 76 MMHG

## 2022-04-01 DIAGNOSIS — M54.16 LUMBAR RADICULOPATHY: ICD-10-CM

## 2022-04-01 DIAGNOSIS — M47.816 LUMBAR SPONDYLOSIS: Primary | ICD-10-CM

## 2022-04-01 PROCEDURE — 99204 PR OFFICE/OUTPT VISIT, NEW, LEVL IV, 45-59 MIN: ICD-10-PCS | Mod: S$PBB,,, | Performed by: PHYSICIAN ASSISTANT

## 2022-04-01 PROCEDURE — 99204 OFFICE O/P NEW MOD 45 MIN: CPT | Mod: S$PBB,,, | Performed by: PHYSICIAN ASSISTANT

## 2022-04-01 RX ORDER — CYCLOBENZAPRINE HCL 10 MG
10 TABLET ORAL 3 TIMES DAILY PRN
COMMUNITY
End: 2022-09-20

## 2022-04-01 NOTE — PROGRESS NOTES
Neurosurgery History and Physical    Patient ID: Kleber Osman is a 77 y.o. male.    Chief Complaint   Patient presents with    Follow-up     Follow up back pain;       Review of Systems   Constitutional: Negative.    HENT: Negative.    Eyes: Negative.    Respiratory: Negative.    Cardiovascular: Negative.    Gastrointestinal: Negative.    Endocrine: Negative.    Genitourinary: Negative.    Musculoskeletal: Positive for back pain.   Skin: Negative.    Allergic/Immunologic: Negative.    Neurological: Positive for numbness. Negative for weakness.   Hematological: Negative.    Psychiatric/Behavioral: Negative.        Past Medical History:   Diagnosis Date    Acute bilateral low back pain with right-sided sciatica 3/25/2019    Anemia, unspecified 4/18/2018    Anticoagulant long-term use     Arthritis     Asthma     Atypical nevus     CAD (coronary artery disease)     Chronic disease anemia 4/18/2018    Clotting disorder     hypergammaglobulinemia    Gout     Heart attack     x4    Insomnia     Iron deficiency anemia secondary to inadequate dietary iron intake 4/18/2018    Ischemic heart disease, chronic     Other and unspecified hyperlipidemia     Pernicious anemia 4/18/2018    Type II or unspecified type diabetes mellitus without mention of complication, not stated as uncontrolled     Unspecified diseases of blood and blood-forming organs     Unspecified essential hypertension      Social History     Socioeconomic History    Marital status:    Tobacco Use    Smoking status: Former Smoker    Smokeless tobacco: Former User    Tobacco comment: quit over 20 years ago   Substance and Sexual Activity    Alcohol use: No    Drug use: No     Family History   Adopted: Yes   Family history unknown: Yes     Review of patient's allergies indicates:   Allergen Reactions    Prednisone      Pt states Dr. Bell told him not to take prednisone due to coumadin use     Lorcet (hydrocodone)  "[hydrocodone-acetaminophen] Other (See Comments)     Hallucinations       Current Outpatient Medications:     albuterol (PROVENTIL/VENTOLIN HFA) 90 mcg/actuation inhaler, Inhale 2 puffs into the lungs every 6 (six) hours as needed for Wheezing., Disp: , Rfl:     amlodipine (NORVASC) 10 MG tablet, Take 1 tablet by mouth once daily., Disp: , Rfl:     aspirin 325 MG tablet, Take 325 mg by mouth once daily., Disp: , Rfl:     benazepriL (LOTENSIN) 40 MG tablet, , Disp: , Rfl:     cyanocobalamin 1,000 mcg/mL injection, INJECT 1 ML INTRAMUSCULARLY EVERY MONTH., Disp: 3 mL, Rfl: 0    cyclobenzaprine (FLEXERIL) 10 MG tablet, Take 10 mg by mouth 3 (three) times daily as needed for Muscle spasms., Disp: , Rfl:     irbesartan-hydrochlorothiazide (AVALIDE) 150-12.5 mg per tablet, TAKE ONE TABLET BY MOUTH DAILY, Disp: 30 tablet, Rfl: 5    iron-vitamin C 100-250 mg, ICAR-C, 100-250 mg Tab, TAKE ONE TABLET BY MOUTH DAILY, Disp: 30 tablet, Rfl: 0    isosorbide mononitrate (IMDUR) 30 MG 24 hr tablet, Take 1 tablet by mouth once daily., Disp: , Rfl:     latanoprost 0.005 % ophthalmic solution, Place 1 drop into both eyes once daily., Disp: , Rfl:     levocetirizine (XYZAL) 5 MG tablet, Take 5 mg by mouth 2 (two) times daily. , Disp: , Rfl:     lovastatin (MEVACOR) 40 MG tablet, TAKE ONE TABLET BY MOUTH IN THE EVENING, Disp: 30 tablet, Rfl: 5    metFORMIN (GLUCOPHAGE) 1000 MG tablet, Take 1 tablet (1,000 mg total) by mouth daily with dinner or evening meal., Disp: 30 tablet, Rfl: 4    metoprolol tartrate (LOPRESSOR) 50 MG tablet, TAKE 1 TABLET (50 MG TOTAL) BY MOUTH 2 (TWO) TIMES DAILY., Disp: 180 tablet, Rfl: 3    triamcinolone (NASACORT) 55 mcg nasal inhaler, 2 sprays by Nasal route daily as needed. , Disp: , Rfl:     warfarin (COUMADIN) 5 MG tablet, Take 5 mg by mouth every evening. 5mg 4 days a week and 7.5mg 3 days a week, Disp: , Rfl:   Blood pressure (!) 151/76, pulse 74, resp. rate 18, height 5' 8" (1.727 m), " weight 84.4 kg (186 lb 1.1 oz).      Neurologic Exam     Mental Status   Oriented to person, place, and time.   Attention: normal. Concentration: normal.   Speech: speech is normal   Level of consciousness: alert  Knowledge: good.     Cranial Nerves     CN II   Visual acuity: normal    CN III, IV, VI   Pupils are equal, round, and reactive to light.  Extraocular motions are normal.     CN V   Facial sensation intact.     CN VII   Facial expression full, symmetric.     CN VIII   Hearing: intact    CN IX, X   Palate: symmetric    CN XI   CN XI normal.     CN XII   CN XII normal.     Motor Exam   Muscle bulk: normal  Overall muscle tone: normal  Right arm pronator drift: absent  Left arm pronator drift: absent    Strength   Right deltoid: 5/5  Left deltoid: 5/5  Right biceps: 5/5  Left biceps: 5/5  Right triceps: 5/5  Left triceps: 5/5  Right wrist flexion: 5/5  Left wrist flexion: 5/5  Right wrist extension: 5/5  Left wrist extension: 5/5  Right interossei: 5/5  Left interossei: 5/5  Right iliopsoas: 5/5  Left iliopsoas: 5/5  Right quadriceps: 5/5  Left quadriceps: 5/5  Right hamstrin/5  Left hamstrin/5  Right anterior tibial: 5/5  Left anterior tibial: 5/5  Right posterior tibial: 5/5  Left posterior tibial: 5/5  Right peroneal: 5/5  Left peroneal: 5/5  Right gastroc: 5/5  Left gastroc: 5/5    Sensory Exam   Right arm light touch: normal  Left arm light touch: normal  Right leg light touch: normal  Left leg light touch: normal    Gait, Coordination, and Reflexes     Gait  Gait: normal    Coordination   Romberg: negative  Finger to nose coordination: normal    Tremor   Resting tremor: absent    Reflexes   Right brachioradialis: 2+  Left brachioradialis: 2+  Right biceps: 2+  Left biceps: 2+  Right triceps: 2+  Left triceps: 2+  Right patellar: 3+  Left patellar: 3+  Right achilles: 0  Left achilles: 1+  Right plantar: normal  Left plantar: normal  Right Cyr: absent  Left Cyr: absent  Right ankle  "clonus: absent  Left ankle clonus: absent      Physical Exam  Vitals and nursing note reviewed.   Constitutional:       Appearance: He is well-developed.   HENT:      Head: Normocephalic and atraumatic.   Eyes:      Extraocular Movements: EOM normal.      Pupils: Pupils are equal, round, and reactive to light.   Cardiovascular:      Rate and Rhythm: Normal rate and regular rhythm.   Pulmonary:      Effort: Pulmonary effort is normal.   Abdominal:      Palpations: Abdomen is soft.   Musculoskeletal:         General: Normal range of motion.      Cervical back: Normal range of motion and neck supple.   Skin:     General: Skin is warm and dry.   Neurological:      Mental Status: He is alert and oriented to person, place, and time.      Coordination: Finger-Nose-Finger Test and Romberg Test normal.      Gait: Gait is intact.      Deep Tendon Reflexes:      Reflex Scores:       Tricep reflexes are 2+ on the right side and 2+ on the left side.       Bicep reflexes are 2+ on the right side and 2+ on the left side.       Brachioradialis reflexes are 2+ on the right side and 2+ on the left side.       Patellar reflexes are 3+ on the right side and 3+ on the left side.       Achilles reflexes are 0 on the right side and 1+ on the left side.  Psychiatric:         Speech: Speech normal.         Behavior: Behavior normal.         Thought Content: Thought content normal.         Judgment: Judgment normal.         Vital Signs  Pulse: 74  Resp: 18  BP: (!) 151/76  Pain Score:   5  Pain Loc: Back  Height and Weight  Height: 5' 8" (172.7 cm)  Weight: 84.4 kg (186 lb 1.1 oz)  BSA (Calculated - sq m): 2.01 sq meters  BMI (Calculated): 28.3  Weight in (lb) to have BMI = 25: 164.1]    Provider dictation:  No updated imaging.     The patient is a 77 year old male who presents for neurosurgical evaluation. The patient was previously seen by Dr. Charlton in 2019 for right S1 radiculopathy. Patient was referred to pain management and PT " at that time. He completed PT with improvement in his symptoms. He did not undergo ZOHAIB at that time due to recent stent placement. He states that his wife passed away in June of 2021 and prior to this she was at home on hospice. He was having to lift her frequently and believes this may have triggered his symptoms again. He reports a 4-5 month history of worsening right lower back pain extending into the right buttock and lateral right thigh. The pain is worse with activity, bending, twisting, or sitting extended periods of time. Denies numbness except baseline numbness in B/L feet due to neuropathy. Denies lower extremity weakness or bowel/bladder dysfunction.    On exam he has no weakness or numbness. Continued absent right Achilles reflex.    I will obtain an updated MRI of the Lumbar spine to further assess for worsening stenosis or acute HNP. I will call the patient with the results and further plan.      Visit Diagnosis:  1. Lumbar spondylosis  MRI Lumbar Spine Without Contrast   2. Lumbar radiculopathy

## 2022-04-14 ENCOUNTER — TELEPHONE (OUTPATIENT)
Dept: NEUROSURGERY | Facility: CLINIC | Age: 77
End: 2022-04-14
Payer: MEDICARE

## 2022-04-14 DIAGNOSIS — M48.062 SPINAL STENOSIS OF LUMBAR REGION WITH NEUROGENIC CLAUDICATION: Primary | ICD-10-CM

## 2022-04-14 NOTE — TELEPHONE ENCOUNTER
Called patient and reviewed results of imaging. Order placed for PT at [facility] in [place] and referral to pain management [facility]. Please have patient scheduled.     Also he needs a follow-up with Dr. Charlton in 2 months.             ----- Message from Eli Webb LPN sent at 4/1/2022 10:53 AM CDT -----  Please call with results of MRI

## 2022-04-18 NOTE — TELEPHONE ENCOUNTER
Spoke with patient and scheduled 2 month f/u. Routed order to care PT in [place]. Gave patient main [facility] number to call to schedule with pain management if he does not hear from them. Patient stated understanding.

## 2022-05-05 ENCOUNTER — OFFICE VISIT (OUTPATIENT)
Dept: PAIN MEDICINE | Facility: CLINIC | Age: 77
End: 2022-05-05
Payer: MEDICARE

## 2022-05-05 VITALS
WEIGHT: 191.5 LBS | SYSTOLIC BLOOD PRESSURE: 144 MMHG | BODY MASS INDEX: 29.02 KG/M2 | HEART RATE: 70 BPM | HEIGHT: 68 IN | OXYGEN SATURATION: 97 % | DIASTOLIC BLOOD PRESSURE: 67 MMHG

## 2022-05-05 DIAGNOSIS — M48.062 SPINAL STENOSIS OF LUMBAR REGION WITH NEUROGENIC CLAUDICATION: ICD-10-CM

## 2022-05-05 PROCEDURE — 99999 PR PBB SHADOW E&M-EST. PATIENT-LVL IV: CPT | Mod: PBBFAC,,, | Performed by: ANESTHESIOLOGY

## 2022-05-05 PROCEDURE — 99204 PR OFFICE/OUTPT VISIT, NEW, LEVL IV, 45-59 MIN: ICD-10-PCS | Mod: S$PBB,,, | Performed by: ANESTHESIOLOGY

## 2022-05-05 PROCEDURE — 99204 OFFICE O/P NEW MOD 45 MIN: CPT | Mod: S$PBB,,, | Performed by: ANESTHESIOLOGY

## 2022-05-05 PROCEDURE — 99999 PR PBB SHADOW E&M-EST. PATIENT-LVL IV: ICD-10-PCS | Mod: PBBFAC,,, | Performed by: ANESTHESIOLOGY

## 2022-05-05 PROCEDURE — 99214 OFFICE O/P EST MOD 30 MIN: CPT | Mod: PBBFAC,PN | Performed by: ANESTHESIOLOGY

## 2022-05-05 NOTE — PROGRESS NOTES
[facility] New Patient Evaluation    Referred by: Zulema Sykes PA-C  Reason for referral: back pain    CC:   Chief Complaint   Patient presents with    Back Pain      Last 3 PDI Scores 3/25/2019   Pain Disability Index (PDI) 6       HPI:   Kleber Osman is a 77 y.o. male who presents with back pain.  He has had this pain for over the last 3 months.  Today his pain is 8/10, constant, aching, grabbing on the right-side lower back.  He denies any regular pain, numbness, weakness.  Pain is worse with back extension and standing.    History:    Current Outpatient Medications:     albuterol (PROVENTIL/VENTOLIN HFA) 90 mcg/actuation inhaler, Inhale 2 puffs into the lungs every 6 (six) hours as needed for Wheezing., Disp: , Rfl:     amlodipine (NORVASC) 10 MG tablet, Take 1 tablet by mouth once daily., Disp: , Rfl:     aspirin 325 MG tablet, Take 325 mg by mouth once daily., Disp: , Rfl:     benazepriL (LOTENSIN) 40 MG tablet, , Disp: , Rfl:     cyanocobalamin 1,000 mcg/mL injection, INJECT 1 ML INTRAMUSCULARLY EVERY MONTH., Disp: 3 mL, Rfl: 0    cyclobenzaprine (FLEXERIL) 10 MG tablet, Take 10 mg by mouth 3 (three) times daily as needed for Muscle spasms., Disp: , Rfl:     irbesartan-hydrochlorothiazide (AVALIDE) 150-12.5 mg per tablet, TAKE ONE TABLET BY MOUTH DAILY, Disp: 30 tablet, Rfl: 5    iron-vitamin C 100-250 mg, ICAR-C, 100-250 mg Tab, TAKE ONE TABLET BY MOUTH DAILY, Disp: 30 tablet, Rfl: 0    isosorbide mononitrate (IMDUR) 30 MG 24 hr tablet, Take 1 tablet by mouth once daily., Disp: , Rfl:     latanoprost 0.005 % ophthalmic solution, Place 1 drop into both eyes once daily., Disp: , Rfl:     levocetirizine (XYZAL) 5 MG tablet, Take 5 mg by mouth 2 (two) times daily. , Disp: , Rfl:     lovastatin (MEVACOR) 40 MG tablet, TAKE ONE TABLET BY MOUTH IN THE EVENING, Disp: 30 tablet, Rfl: 5    metFORMIN (GLUCOPHAGE) 1000 MG tablet, Take 1 tablet (1,000 mg total) by mouth daily with dinner  or evening meal., Disp: 30 tablet, Rfl: 4    metoprolol tartrate (LOPRESSOR) 50 MG tablet, TAKE 1 TABLET (50 MG TOTAL) BY MOUTH 2 (TWO) TIMES DAILY., Disp: 180 tablet, Rfl: 3    triamcinolone (NASACORT) 55 mcg nasal inhaler, 2 sprays by Nasal route daily as needed. , Disp: , Rfl:     warfarin (COUMADIN) 5 MG tablet, Take 5 mg by mouth every evening. 5mg 4 days a week and 7.5mg 3 days a week, Disp: , Rfl:     Past Medical History:   Diagnosis Date    Acute bilateral low back pain with right-sided sciatica 3/25/2019    Anemia, unspecified 4/18/2018    Anticoagulant long-term use     Arthritis     Asthma     Atypical nevus     CAD (coronary artery disease)     Chronic disease anemia 4/18/2018    Clotting disorder     hypergammaglobulinemia    Gout     Heart attack     x4    Insomnia     Iron deficiency anemia secondary to inadequate dietary iron intake 4/18/2018    Ischemic heart disease, chronic     Other and unspecified hyperlipidemia     Pernicious anemia 4/18/2018    Type II or unspecified type diabetes mellitus without mention of complication, not stated as uncontrolled     Unspecified diseases of blood and blood-forming organs     Unspecified essential hypertension        Past Surgical History:   Procedure Laterality Date    APPENDECTOMY      BACK SURGERY      CARDIAC CATHETERIZATION      angioplasty and stents    cardiac stents      CATARACT EXTRACTION W/ INTRAOCULAR LENS  IMPLANT, BILATERAL      CORONARY ANGIOGRAPHY N/A 2/14/2019    Procedure: ANGIOGRAM, CORONARY ARTERY;  Surgeon: Ritesh Farmer MD;  Location: [facility] CATH;  Service: Cardiology;  Laterality: N/A;    CORONARY ARTERY BYPASS GRAFT  2000    CORONARY STENT PLACEMENT N/A 2/14/2019    Procedure: INSERTION, STENT, CORONARY ARTERY;  Surgeon: Ritesh Farmer MD;  [facility];  Service: Cardiology;  Laterality: N/A;    LEG SURGERY Left     steel lucinda placement    PROCTOSCOPY      sigmoid colectomy      skin cancers  "removed          Family History   Adopted: Yes   Family history unknown: Yes       Social History     Socioeconomic History    Marital status:    Tobacco Use    Smoking status: Former Smoker    Smokeless tobacco: Former User    Tobacco comment: quit over 20 years ago   Substance and Sexual Activity    Alcohol use: No    Drug use: No       Review of patient's allergies indicates:   Allergen Reactions    Prednisone      Pt states Dr. Bell told him not to take prednisone due to coumadin use     Lorcet (hydrocodone) [hydrocodone-acetaminophen] Other (See Comments)     Hallucinations       Review of Systems:  General ROS: negative for - fever  Psychological ROS: negative for - hostility  Hematological and Lymphatic ROS: negative for - bleeding problems  Endocrine ROS: negative for - unexpected weight changes  Respiratory ROS: no cough, shortness of breath, or wheezing  Cardiovascular ROS: no chest pain or dyspnea on exertion  Gastrointestinal ROS: no abdominal pain, change in bowel habits, or black or bloody stools  Musculoskeletal ROS: negative for - muscular weakness  Neurological ROS: negative for - numbness/tingling  Dermatological ROS: negative for rash    Physical Exam:  Vitals:    05/05/22 1038   BP: (!) 144/67   Pulse: 70   SpO2: 97%   Weight: 86.8 kg (191 lb 7.5 oz)   Height: 5' 8" (1.727 m)   PainSc:   4   PainLoc: Back     Body mass index is 29.11 kg/m².    Gen: NAD  Gait: gait intact  Psych:  Mood appropriate for given condition  HEENT: eyes anicteric   GI: Abd soft  CV: RRR  Lungs: breathing unlabored   ROM: limited AROM of the L spine in all planes, full ROM at ankles, knees and hips  Sensation: intact to light touch in all dermatomes tested from L2-S1 bilaterally  Reflexes: 0 b/l patella and achilles  Palpation: Diffusely tender over lumbar paraspinals  -TTP over the b/l greater trochanters and bilateral SI joint  Tone: normal in the b/l knees and hips   Skin: intact  Extremities: No " edema in b/l ankles or hands  Provacative tests: + axial facet loading on the right       Right Left   L2/3 Iliacus Hip flexion  5  5   L3/4 Qudratus Femoris Knee Extension  5  5   L4/5 Tib Anterior Ankle Dorsiflexion   5  5   L5/S1 Extensor Hallicus Longus Great toe extension  5  5                 S1/S2 Gastroc/Soleus Plantar Flexion  5  5       Imaging:  MRI lumbar spine 4/14/22  FINDINGS:  Vertebral bodies are normal in height and alignment.  There are mild Modic type 1 degenerative endplate changes on the right at L2-3. Disc desiccation disc space narrowing are present throughout the lumbar spine.     Conus medullaris terminates at the T12-L1 level.     L1-2: Biconvex disc bulge and facet hypertrophy result in mild left lateral recess stenosis.  Spinal canal and neural foramina are patent.  This level is unchanged.  L2-3: Broad-based disc bulge eccentric to the left and facet hypertrophy results in mild to moderate bilateral neural foraminal and lateral recess stenosis, similar to the prior study.  Mild spinal canal narrowing is new.  L3-4: Small broad-based disc bulge and facet hypertrophy results in mild bilateral neural foraminal and lateral recess stenosis.  Spinal canal is patent.  This level is unchanged.  L4-5: Broad-based disc bulge eccentric to the right along with facet hypertrophy results in moderate right greater than left neural foraminal and lateral recess stenosis with mild spinal canal stenosis.  Neural foraminal and lateral recess stenosis at this level has slightly progressed.  L5-S1: Small broad-based disc bulge and facet hypertrophy results in moderate left greater than right neural foraminal and lateral recess stenosis, slightly progressed since the prior study.  Spinal canal is patent.    Labs:  BMP  Lab Results   Component Value Date     11/18/2021    K 3.4 (L) 11/18/2021    CL 99 11/18/2021    CO2 31 (H) 11/18/2021    BUN 10 11/18/2021    CREATININE 0.8 11/18/2021    CALCIUM 10.0  11/18/2021    ANIONGAP 10 11/18/2021    ESTGFRAFRICA >60.0 11/18/2021    EGFRNONAA >60.0 11/18/2021     Lab Results   Component Value Date    ALT 23 11/18/2021    AST 27 11/18/2021    ALKPHOS 71 11/18/2021    BILITOT 0.5 11/18/2021       Assessment:   Problem List Items Addressed This Visit    None     Visit Diagnoses     Spinal stenosis of lumbar region with neurogenic claudication                77 y.o. year old male with PMH HTN, DM II, coronary artery disease who presents with back pain.  He has had this pain for over the last 3 months.  Today his pain is 8/10, constant, aching, grabbing on the right-side lower back.  He denies any regular pain, numbness, weakness.  Pain is worse with back extension and standing.    - on exam he has full strength intact sensation to light touch.  Has reproducible pain with right-sided axial facet loading  - I independently reviewed his lumbar MRI is consistent with multilevel bilateral facet arthropathy and multilevel lateral recess narrowing worse at L4-5  - he has prior left L4-5 laminectomy with excellent resolution radicular symptoms  - I think is right-sided back pain is secondary to lumbar spondylosis  - he has been doing formal physical therapy for the last 2 weeks he has noticed some improvement.  We discussed diagnostic lumbar medial branch blocks worse continuing formal PT  - at this time he would like to continue the physical therapy which I think is a good idea.  He will do it for another 4 weeks and then follow-up with us in the office.  If he has not gotten significant relief like he would like then we will schedule for diagnostic right-sided lumbar medial branch blocks    : Not applicable    Augustin Blackburn M.D.  Interventional Pain Medicine / Anesthesiology    This note was completed with dictation software and grammatical errors may exist.

## 2022-05-20 ENCOUNTER — LAB VISIT (OUTPATIENT)
Dept: FAMILY MEDICINE | Facility: CLINIC | Age: 77
End: 2022-05-20
Payer: MEDICARE

## 2022-05-20 ENCOUNTER — CLINICAL SUPPORT (OUTPATIENT)
Dept: FAMILY MEDICINE | Facility: CLINIC | Age: 77
End: 2022-05-20
Payer: MEDICARE

## 2022-05-20 DIAGNOSIS — D63.8 CHRONIC DISEASE ANEMIA: ICD-10-CM

## 2022-05-20 DIAGNOSIS — E11.9 TYPE 2 DIABETES MELLITUS WITHOUT COMPLICATION, WITHOUT LONG-TERM CURRENT USE OF INSULIN: ICD-10-CM

## 2022-05-20 LAB
ALBUMIN SERPL BCP-MCNC: 4.2 G/DL (ref 3.5–5.2)
ALBUMIN/CREAT UR: 7.2 UG/MG (ref 0–30)
ALP SERPL-CCNC: 60 U/L (ref 55–135)
ALT SERPL W/O P-5'-P-CCNC: 21 U/L (ref 10–44)
ANION GAP SERPL CALC-SCNC: 7 MMOL/L (ref 8–16)
AST SERPL-CCNC: 19 U/L (ref 10–40)
BASOPHILS # BLD AUTO: 0.05 K/UL (ref 0–0.2)
BASOPHILS NFR BLD: 0.7 % (ref 0–1.9)
BILIRUB SERPL-MCNC: 0.5 MG/DL (ref 0.1–1)
BUN SERPL-MCNC: 13 MG/DL (ref 8–23)
CALCIUM SERPL-MCNC: 9.9 MG/DL (ref 8.7–10.5)
CHLORIDE SERPL-SCNC: 104 MMOL/L (ref 95–110)
CHOLEST SERPL-MCNC: 163 MG/DL (ref 120–199)
CHOLEST/HDLC SERPL: 3.5 {RATIO} (ref 2–5)
CO2 SERPL-SCNC: 29 MMOL/L (ref 23–29)
CREAT SERPL-MCNC: 0.8 MG/DL (ref 0.5–1.4)
CREAT UR-MCNC: 139 MG/DL (ref 23–375)
DIFFERENTIAL METHOD: ABNORMAL
EOSINOPHIL # BLD AUTO: 0.3 K/UL (ref 0–0.5)
EOSINOPHIL NFR BLD: 3.3 % (ref 0–8)
ERYTHROCYTE [DISTWIDTH] IN BLOOD BY AUTOMATED COUNT: 13.1 % (ref 11.5–14.5)
EST. GFR  (AFRICAN AMERICAN): >60 ML/MIN/1.73 M^2
EST. GFR  (NON AFRICAN AMERICAN): >60 ML/MIN/1.73 M^2
ESTIMATED AVG GLUCOSE: 123 MG/DL (ref 68–131)
GLUCOSE SERPL-MCNC: 124 MG/DL (ref 70–110)
HBA1C MFR BLD: 5.9 % (ref 4–5.6)
HCT VFR BLD AUTO: 41.3 % (ref 40–54)
HDLC SERPL-MCNC: 46 MG/DL (ref 40–75)
HDLC SERPL: 28.2 % (ref 20–50)
HGB BLD-MCNC: 13.9 G/DL (ref 14–18)
IMM GRANULOCYTES # BLD AUTO: 0.01 K/UL (ref 0–0.04)
IMM GRANULOCYTES NFR BLD AUTO: 0.1 % (ref 0–0.5)
LDLC SERPL CALC-MCNC: 68.8 MG/DL (ref 63–159)
LYMPHOCYTES # BLD AUTO: 1.4 K/UL (ref 1–4.8)
LYMPHOCYTES NFR BLD: 18.1 % (ref 18–48)
MCH RBC QN AUTO: 29.8 PG (ref 27–31)
MCHC RBC AUTO-ENTMCNC: 33.7 G/DL (ref 32–36)
MCV RBC AUTO: 89 FL (ref 82–98)
MICROALBUMIN UR DL<=1MG/L-MCNC: 10 UG/ML
MONOCYTES # BLD AUTO: 0.9 K/UL (ref 0.3–1)
MONOCYTES NFR BLD: 11.8 % (ref 4–15)
NEUTROPHILS # BLD AUTO: 4.9 K/UL (ref 1.8–7.7)
NEUTROPHILS NFR BLD: 66 % (ref 38–73)
NONHDLC SERPL-MCNC: 117 MG/DL
NRBC BLD-RTO: 0 /100 WBC
PLATELET # BLD AUTO: 256 K/UL (ref 150–450)
PMV BLD AUTO: 9.5 FL (ref 9.2–12.9)
POTASSIUM SERPL-SCNC: 3.8 MMOL/L (ref 3.5–5.1)
PROT SERPL-MCNC: 7 G/DL (ref 6–8.4)
RBC # BLD AUTO: 4.66 M/UL (ref 4.6–6.2)
SODIUM SERPL-SCNC: 140 MMOL/L (ref 136–145)
TRIGL SERPL-MCNC: 241 MG/DL (ref 30–150)
WBC # BLD AUTO: 7.47 K/UL (ref 3.9–12.7)

## 2022-05-20 PROCEDURE — 85025 COMPLETE CBC W/AUTO DIFF WBC: CPT | Performed by: INTERNAL MEDICINE

## 2022-05-20 PROCEDURE — 80053 COMPREHEN METABOLIC PANEL: CPT | Performed by: INTERNAL MEDICINE

## 2022-05-20 PROCEDURE — 83036 HEMOGLOBIN GLYCOSYLATED A1C: CPT | Performed by: INTERNAL MEDICINE

## 2022-05-20 PROCEDURE — 82043 UR ALBUMIN QUANTITATIVE: CPT | Performed by: INTERNAL MEDICINE

## 2022-05-20 PROCEDURE — 80061 LIPID PANEL: CPT | Performed by: INTERNAL MEDICINE

## 2022-05-20 PROCEDURE — 82570 ASSAY OF URINE CREATININE: CPT | Performed by: INTERNAL MEDICINE

## 2022-06-14 ENCOUNTER — OFFICE VISIT (OUTPATIENT)
Dept: NEUROSURGERY | Facility: CLINIC | Age: 77
End: 2022-06-14
Payer: MEDICARE

## 2022-06-14 VITALS
DIASTOLIC BLOOD PRESSURE: 72 MMHG | HEIGHT: 68 IN | RESPIRATION RATE: 18 BRPM | BODY MASS INDEX: 29.01 KG/M2 | HEART RATE: 72 BPM | WEIGHT: 191.38 LBS | SYSTOLIC BLOOD PRESSURE: 143 MMHG

## 2022-06-14 DIAGNOSIS — M48.062 SPINAL STENOSIS OF LUMBAR REGION WITH NEUROGENIC CLAUDICATION: Primary | ICD-10-CM

## 2022-06-14 PROCEDURE — 99214 PR OFFICE/OUTPT VISIT, EST, LEVL IV, 30-39 MIN: ICD-10-PCS | Mod: S$PBB,,, | Performed by: NEUROLOGICAL SURGERY

## 2022-06-14 PROCEDURE — 99214 OFFICE O/P EST MOD 30 MIN: CPT | Mod: S$PBB,,, | Performed by: NEUROLOGICAL SURGERY

## 2022-06-14 NOTE — PROGRESS NOTES
Neurosurgery History and Physical    Patient ID: Kleber Osman is a 77 y.o. male.    Chief Complaint   Patient presents with    Follow-up     Patient presents to clinic for 2 month follow up after PT and pain management, lumbar. Patient is without complaints today.        Review of Systems   Constitutional: Negative.    HENT: Negative.    Eyes: Negative.    Respiratory: Negative.    Cardiovascular: Negative.    Gastrointestinal: Negative.    Endocrine: Negative.    Genitourinary: Negative.    Musculoskeletal: Negative for back pain.   Skin: Negative.    Allergic/Immunologic: Negative.    Neurological: Negative for weakness and numbness.   Hematological: Negative.    Psychiatric/Behavioral: Negative.        Past Medical History:   Diagnosis Date    Acute bilateral low back pain with right-sided sciatica 3/25/2019    Anemia, unspecified 4/18/2018    Anticoagulant long-term use     Arthritis     Asthma     Atypical nevus     CAD (coronary artery disease)     Chronic disease anemia 4/18/2018    Clotting disorder     hypergammaglobulinemia    Gout     Heart attack     x4    Insomnia     Iron deficiency anemia secondary to inadequate dietary iron intake 4/18/2018    Ischemic heart disease, chronic     Other and unspecified hyperlipidemia     Pernicious anemia 4/18/2018    Type II or unspecified type diabetes mellitus without mention of complication, not stated as uncontrolled     Unspecified diseases of blood and blood-forming organs     Unspecified essential hypertension      Social History     Socioeconomic History    Marital status:    Tobacco Use    Smoking status: Former Smoker    Smokeless tobacco: Former User    Tobacco comment: quit over 20 years ago   Substance and Sexual Activity    Alcohol use: No    Drug use: No     Family History   Adopted: Yes   Family history unknown: Yes     Review of patient's allergies indicates:   Allergen Reactions    Prednisone      Pt states   Arabella told him not to take prednisone due to coumadin use     Lorcet (hydrocodone) [hydrocodone-acetaminophen] Other (See Comments)     Hallucinations    Lorcet (propoxyphene) Hives and Itching       Current Outpatient Medications:     albuterol (PROVENTIL/VENTOLIN HFA) 90 mcg/actuation inhaler, Inhale 2 puffs into the lungs every 6 (six) hours as needed for Wheezing., Disp: , Rfl:     amlodipine (NORVASC) 10 MG tablet, Take 1 tablet by mouth once daily., Disp: , Rfl:     aspirin 325 MG tablet, Take 325 mg by mouth once daily., Disp: , Rfl:     benazepriL (LOTENSIN) 40 MG tablet, , Disp: , Rfl:     cyanocobalamin 1,000 mcg/mL injection, INJECT 1 ML INTRAMUSCULARLY EVERY MONTH., Disp: 3 mL, Rfl: 0    cyclobenzaprine (FLEXERIL) 10 MG tablet, Take 10 mg by mouth 3 (three) times daily as needed for Muscle spasms., Disp: , Rfl:     irbesartan-hydrochlorothiazide (AVALIDE) 150-12.5 mg per tablet, TAKE ONE TABLET BY MOUTH DAILY, Disp: 30 tablet, Rfl: 5    iron-vitamin C 100-250 mg, ICAR-C, 100-250 mg Tab, TAKE ONE TABLET BY MOUTH DAILY, Disp: 30 tablet, Rfl: 0    isosorbide mononitrate (IMDUR) 30 MG 24 hr tablet, Take 1 tablet by mouth once daily., Disp: , Rfl:     latanoprost 0.005 % ophthalmic solution, Place 1 drop into both eyes once daily., Disp: , Rfl:     levocetirizine (XYZAL) 5 MG tablet, Take 5 mg by mouth 2 (two) times daily. , Disp: , Rfl:     lovastatin (MEVACOR) 40 MG tablet, TAKE ONE TABLET BY MOUTH IN THE EVENING, Disp: 30 tablet, Rfl: 5    metFORMIN (GLUCOPHAGE) 1000 MG tablet, Take 1 tablet (1,000 mg total) by mouth daily with dinner or evening meal., Disp: 30 tablet, Rfl: 4    metoprolol tartrate (LOPRESSOR) 50 MG tablet, TAKE 1 TABLET (50 MG TOTAL) BY MOUTH 2 (TWO) TIMES DAILY., Disp: 180 tablet, Rfl: 3    triamcinolone (NASACORT) 55 mcg nasal inhaler, 2 sprays by Nasal route daily as needed. , Disp: , Rfl:     warfarin (COUMADIN) 5 MG tablet, Take 5 mg by mouth every evening. 5mg 4  "days a week and 7.5mg 3 days a week, Disp: , Rfl:   Blood pressure (!) 143/72, pulse 72, resp. rate 18, height 5' 8" (1.727 m), weight 86.8 kg (191 lb 5.8 oz).      Neurologic Exam     Mental Status   Oriented to person, place, and time.   Attention: normal. Concentration: normal.   Speech: speech is normal   Level of consciousness: alert  Knowledge: good.     Cranial Nerves     CN II   Visual acuity: normal    CN III, IV, VI   Pupils are equal, round, and reactive to light.  Extraocular motions are normal.     CN V   Facial sensation intact.     CN VII   Facial expression full, symmetric.     CN VIII   Hearing: intact    CN IX, X   Palate: symmetric    CN XI   CN XI normal.     CN XII   CN XII normal.     Motor Exam   Muscle bulk: normal  Overall muscle tone: normal  Right arm pronator drift: absent  Left arm pronator drift: absent    Strength   Right deltoid: 5/5  Left deltoid: 5/5  Right biceps: 5/5  Left biceps: 5/5  Right triceps: 5/5  Left triceps: 5/5  Right wrist flexion: 5/5  Left wrist flexion: 5/5  Right wrist extension: 5/5  Left wrist extension: 5/5  Right interossei: 5/5  Left interossei: 5/5  Right iliopsoas: 5/5  Left iliopsoas: 5/5  Right quadriceps: 5/5  Left quadriceps: 5/5  Right hamstrin/5  Left hamstrin/5  Right anterior tibial: 5/5  Left anterior tibial: 5/5  Right posterior tibial: 5/5  Left posterior tibial: 5/5  Right peroneal: 5/5  Left peroneal: 5/5  Right gastroc: 5/5  Left gastroc: 5/5    Sensory Exam   Right arm light touch: normal  Left arm light touch: normal  Right leg light touch: normal  Left leg light touch: normal    Gait, Coordination, and Reflexes     Gait  Gait: normal    Coordination   Romberg: negative  Finger to nose coordination: normal    Tremor   Resting tremor: absent    Reflexes   Right brachioradialis: 2+  Left brachioradialis: 2+  Right biceps: 2+  Left biceps: 2+  Right triceps: 2+  Left triceps: 2+  Right patellar: 3+  Left patellar: 3+  Right achilles: " "0  Left achilles: 0  Right plantar: normal  Left plantar: normal  Right Cyr: absent  Left Cyr: absent  Right ankle clonus: absent  Left ankle clonus: absent      Physical Exam  Vitals and nursing note reviewed.   Constitutional:       Appearance: He is well-developed.   HENT:      Head: Normocephalic and atraumatic.   Eyes:      Extraocular Movements: EOM normal.      Pupils: Pupils are equal, round, and reactive to light.   Cardiovascular:      Rate and Rhythm: Normal rate and regular rhythm.   Pulmonary:      Effort: Pulmonary effort is normal.   Abdominal:      Palpations: Abdomen is soft.   Musculoskeletal:         General: Normal range of motion.      Cervical back: Normal range of motion and neck supple.   Skin:     General: Skin is warm and dry.   Neurological:      Mental Status: He is alert and oriented to person, place, and time.      Coordination: Finger-Nose-Finger Test and Romberg Test normal.      Gait: Gait is intact.      Deep Tendon Reflexes:      Reflex Scores:       Tricep reflexes are 2+ on the right side and 2+ on the left side.       Bicep reflexes are 2+ on the right side and 2+ on the left side.       Brachioradialis reflexes are 2+ on the right side and 2+ on the left side.       Patellar reflexes are 3+ on the right side and 3+ on the left side.       Achilles reflexes are 0 on the right side and 0 on the left side.  Psychiatric:         Speech: Speech normal.         Behavior: Behavior normal.         Thought Content: Thought content normal.         Judgment: Judgment normal.         Vital Signs  Pulse: 72  Resp: 18  BP: (!) 143/72  BP Location: Right arm  Patient Position: Sitting  Pain Score: 0-No pain  Pain Loc: Back  Height and Weight  Height: 5' 8" (172.7 cm)  Weight: 86.8 kg (191 lb 5.8 oz)  BSA (Calculated - sq m): 2.04 sq meters  BMI (Calculated): 29.1  Weight in (lb) to have BMI = 25: 164.1]    Provider dictation:  I reviewed the imaging.   "MRI of the lumbar spine is " "stable compared to 2019 and does not have any acute surgical indications."    "The patient is a 77 year old male who presents for neurosurgical evaluation. The patient was previously seen by Dr. Charlton in 2019 for right S1 radiculopathy. Patient was referred to pain management and PT at that time. He completed PT with improvement in his symptoms. He did not undergo ZOHAIB at that time due to recent stent placement. He states that his wife passed away in June of 2021 and prior to this she was at home on hospice. He was having to lift her frequently and believes this may have triggered his symptoms again. He reports a 4-5 month history of worsening right lower back pain extending into the right buttock and lateral right thigh. The pain is worse with activity, bending, twisting, or sitting extended periods of time. Denies numbness except baseline numbness in B/L feet due to neuropathy. Denies lower extremity weakness or bowel/bladder dysfunction."    Since last visit patient has been participating in physical therapy and reports resolution of back pain. He has been evaluated by Dr. Blackburn and is scheduled for a follow-up tomorrow which he is requesting to have cancelled since he is doing so well.     On exam he has no weakness or numbness.    Given patient's resolution of symptoms with physical therapy; no follow-up is needed.          Visit Diagnosis:  1. Spinal stenosis of lumbar region with neurogenic claudication           "

## 2022-06-14 NOTE — PROGRESS NOTES
I have seen the patient, reviewed the Advanced Practice Provider's history and physical, assessment and plan. I have personally interviewed and examined the patient at bedside and interpreted the relevant imaging and lab work and I agree with the findings. I personally performed the documented services. See below for any additional comments.    States his pain has fully resolved with physical therapy.  No new numbness, paresthesias or weakness.    MRI of the lumbar spine is stable compared to 2019 and does not have any acute surgical indications.    Neurological exam is stable.    Given his excellent response to physical therapy, no further intervention is indicated and he may follow up as needed.

## 2022-09-22 PROBLEM — R94.39 ABNORMAL STRESS ECG: Status: ACTIVE | Noted: 2022-09-22

## 2022-11-29 PROBLEM — E72.12 METHYLENETETRAHYDROFOLATE REDUCTASE DEFICIENCY: Status: ACTIVE | Noted: 2022-11-29

## 2022-11-30 ENCOUNTER — LAB VISIT (OUTPATIENT)
Dept: FAMILY MEDICINE | Facility: CLINIC | Age: 77
End: 2022-11-30
Payer: MEDICARE

## 2022-11-30 DIAGNOSIS — I25.10 ATHEROSCLEROSIS OF NATIVE CORONARY ARTERY OF NATIVE HEART WITHOUT ANGINA PECTORIS: ICD-10-CM

## 2022-11-30 DIAGNOSIS — E11.9 TYPE 2 DIABETES MELLITUS WITHOUT COMPLICATION, WITHOUT LONG-TERM CURRENT USE OF INSULIN: ICD-10-CM

## 2022-11-30 DIAGNOSIS — D68.9 BLOOD CLOTTING DISORDER: ICD-10-CM

## 2022-11-30 LAB
ALBUMIN SERPL BCP-MCNC: 4.3 G/DL (ref 3.5–5.2)
ALP SERPL-CCNC: 68 U/L (ref 55–135)
ALT SERPL W/O P-5'-P-CCNC: 27 U/L (ref 10–44)
ANION GAP SERPL CALC-SCNC: 7 MMOL/L (ref 8–16)
AST SERPL-CCNC: 27 U/L (ref 10–40)
BASOPHILS # BLD AUTO: 0.06 K/UL (ref 0–0.2)
BASOPHILS NFR BLD: 0.6 % (ref 0–1.9)
BILIRUB SERPL-MCNC: 0.5 MG/DL (ref 0.1–1)
BUN SERPL-MCNC: 13 MG/DL (ref 8–23)
CALCIUM SERPL-MCNC: 10.1 MG/DL (ref 8.7–10.5)
CHLORIDE SERPL-SCNC: 105 MMOL/L (ref 95–110)
CHOLEST SERPL-MCNC: 159 MG/DL (ref 120–199)
CHOLEST/HDLC SERPL: 3.4 {RATIO} (ref 2–5)
CO2 SERPL-SCNC: 31 MMOL/L (ref 23–29)
CREAT SERPL-MCNC: 0.9 MG/DL (ref 0.5–1.4)
DIFFERENTIAL METHOD: ABNORMAL
EOSINOPHIL # BLD AUTO: 0.3 K/UL (ref 0–0.5)
EOSINOPHIL NFR BLD: 2.7 % (ref 0–8)
ERYTHROCYTE [DISTWIDTH] IN BLOOD BY AUTOMATED COUNT: 13.1 % (ref 11.5–14.5)
EST. GFR  (NO RACE VARIABLE): >60 ML/MIN/1.73 M^2
ESTIMATED AVG GLUCOSE: 120 MG/DL (ref 68–131)
GLUCOSE SERPL-MCNC: 125 MG/DL (ref 70–110)
HBA1C MFR BLD: 5.8 % (ref 4–5.6)
HCT VFR BLD AUTO: 45.2 % (ref 40–54)
HDLC SERPL-MCNC: 47 MG/DL (ref 40–75)
HDLC SERPL: 29.6 % (ref 20–50)
HGB BLD-MCNC: 14.4 G/DL (ref 14–18)
IMM GRANULOCYTES # BLD AUTO: 0.02 K/UL (ref 0–0.04)
IMM GRANULOCYTES NFR BLD AUTO: 0.2 % (ref 0–0.5)
LDLC SERPL CALC-MCNC: 69.2 MG/DL (ref 63–159)
LYMPHOCYTES # BLD AUTO: 1.7 K/UL (ref 1–4.8)
LYMPHOCYTES NFR BLD: 18.6 % (ref 18–48)
MCH RBC QN AUTO: 29.5 PG (ref 27–31)
MCHC RBC AUTO-ENTMCNC: 31.9 G/DL (ref 32–36)
MCV RBC AUTO: 93 FL (ref 82–98)
MONOCYTES # BLD AUTO: 0.8 K/UL (ref 0.3–1)
MONOCYTES NFR BLD: 9.1 % (ref 4–15)
NEUTROPHILS # BLD AUTO: 6.4 K/UL (ref 1.8–7.7)
NEUTROPHILS NFR BLD: 68.8 % (ref 38–73)
NONHDLC SERPL-MCNC: 112 MG/DL
NRBC BLD-RTO: 0 /100 WBC
PLATELET # BLD AUTO: 278 K/UL (ref 150–450)
PMV BLD AUTO: 9.5 FL (ref 9.2–12.9)
POTASSIUM SERPL-SCNC: 4.7 MMOL/L (ref 3.5–5.1)
PROT SERPL-MCNC: 7.1 G/DL (ref 6–8.4)
RBC # BLD AUTO: 4.88 M/UL (ref 4.6–6.2)
SODIUM SERPL-SCNC: 143 MMOL/L (ref 136–145)
TRIGL SERPL-MCNC: 214 MG/DL (ref 30–150)
WBC # BLD AUTO: 9.24 K/UL (ref 3.9–12.7)

## 2022-11-30 PROCEDURE — 80053 COMPREHEN METABOLIC PANEL: CPT | Performed by: INTERNAL MEDICINE

## 2022-11-30 PROCEDURE — 83036 HEMOGLOBIN GLYCOSYLATED A1C: CPT | Performed by: INTERNAL MEDICINE

## 2022-11-30 PROCEDURE — 80061 LIPID PANEL: CPT | Performed by: INTERNAL MEDICINE

## 2022-11-30 PROCEDURE — 82043 UR ALBUMIN QUANTITATIVE: CPT | Performed by: INTERNAL MEDICINE

## 2022-11-30 PROCEDURE — 82570 ASSAY OF URINE CREATININE: CPT | Performed by: INTERNAL MEDICINE

## 2022-11-30 PROCEDURE — 85025 COMPLETE CBC W/AUTO DIFF WBC: CPT | Performed by: INTERNAL MEDICINE

## 2022-11-30 NOTE — PROGRESS NOTES
Venipuncture performed with 21 gauge butterfly, x's 1 attempt,  to R Basilic vein.  Specimens collected per orders.      Pressure dressing applied to site, instructed patient to remove dressing in 10-15 minutes, OK to re-adjust dressing if pressure causing any discomfort, to observe closely for numbness and/or discoloration to hand or fingers, and to notify provider if bleeding persists after applying constant pressure lasting 30 minutes.       Clean catch urine collected

## 2022-12-01 LAB
ALBUMIN/CREAT UR: 6.6 UG/MG (ref 0–30)
CREAT UR-MCNC: 136 MG/DL (ref 23–375)
MICROALBUMIN UR DL<=1MG/L-MCNC: 9 UG/ML